# Patient Record
Sex: FEMALE | Race: WHITE | NOT HISPANIC OR LATINO | URBAN - METROPOLITAN AREA
[De-identification: names, ages, dates, MRNs, and addresses within clinical notes are randomized per-mention and may not be internally consistent; named-entity substitution may affect disease eponyms.]

---

## 2017-01-09 ENCOUNTER — COMMUNICATION - HEALTHEAST (OUTPATIENT)
Dept: FAMILY MEDICINE | Facility: CLINIC | Age: 22
End: 2017-01-09

## 2017-01-09 DIAGNOSIS — F90.1 ADHD (ATTENTION DEFICIT HYPERACTIVITY DISORDER), PREDOMINANTLY HYPERACTIVE IMPULSIVE TYPE: ICD-10-CM

## 2017-02-06 ENCOUNTER — COMMUNICATION - HEALTHEAST (OUTPATIENT)
Dept: FAMILY MEDICINE | Facility: CLINIC | Age: 22
End: 2017-02-06

## 2017-02-06 DIAGNOSIS — F90.1 ADHD (ATTENTION DEFICIT HYPERACTIVITY DISORDER), PREDOMINANTLY HYPERACTIVE IMPULSIVE TYPE: ICD-10-CM

## 2017-03-06 ENCOUNTER — COMMUNICATION - HEALTHEAST (OUTPATIENT)
Dept: FAMILY MEDICINE | Facility: CLINIC | Age: 22
End: 2017-03-06

## 2017-03-06 DIAGNOSIS — F90.1 ADHD (ATTENTION DEFICIT HYPERACTIVITY DISORDER), PREDOMINANTLY HYPERACTIVE IMPULSIVE TYPE: ICD-10-CM

## 2017-04-04 ENCOUNTER — COMMUNICATION - HEALTHEAST (OUTPATIENT)
Dept: FAMILY MEDICINE | Facility: CLINIC | Age: 22
End: 2017-04-04

## 2017-04-04 DIAGNOSIS — F90.1 ADHD (ATTENTION DEFICIT HYPERACTIVITY DISORDER), PREDOMINANTLY HYPERACTIVE IMPULSIVE TYPE: ICD-10-CM

## 2017-05-02 ENCOUNTER — COMMUNICATION - HEALTHEAST (OUTPATIENT)
Dept: FAMILY MEDICINE | Facility: CLINIC | Age: 22
End: 2017-05-02

## 2017-05-02 DIAGNOSIS — F90.1 ADHD (ATTENTION DEFICIT HYPERACTIVITY DISORDER), PREDOMINANTLY HYPERACTIVE IMPULSIVE TYPE: ICD-10-CM

## 2017-05-31 ENCOUNTER — OFFICE VISIT - HEALTHEAST (OUTPATIENT)
Dept: FAMILY MEDICINE | Facility: CLINIC | Age: 22
End: 2017-05-31

## 2017-05-31 DIAGNOSIS — F90.1 ADHD (ATTENTION DEFICIT HYPERACTIVITY DISORDER), PREDOMINANTLY HYPERACTIVE IMPULSIVE TYPE: ICD-10-CM

## 2017-05-31 DIAGNOSIS — Z23 IMMUNIZATION DUE: ICD-10-CM

## 2017-06-02 ENCOUNTER — COMMUNICATION - HEALTHEAST (OUTPATIENT)
Dept: FAMILY MEDICINE | Facility: CLINIC | Age: 22
End: 2017-06-02

## 2017-06-02 DIAGNOSIS — F90.1 ADHD (ATTENTION DEFICIT HYPERACTIVITY DISORDER), PREDOMINANTLY HYPERACTIVE IMPULSIVE TYPE: ICD-10-CM

## 2017-06-30 ENCOUNTER — OFFICE VISIT - HEALTHEAST (OUTPATIENT)
Dept: FAMILY MEDICINE | Facility: CLINIC | Age: 22
End: 2017-06-30

## 2017-06-30 DIAGNOSIS — S61.219A LACERATION OF FINGER, LEFT: ICD-10-CM

## 2017-07-03 ENCOUNTER — COMMUNICATION - HEALTHEAST (OUTPATIENT)
Dept: FAMILY MEDICINE | Facility: CLINIC | Age: 22
End: 2017-07-03

## 2017-07-03 DIAGNOSIS — F90.1 ADHD (ATTENTION DEFICIT HYPERACTIVITY DISORDER), PREDOMINANTLY HYPERACTIVE IMPULSIVE TYPE: ICD-10-CM

## 2017-07-31 ENCOUNTER — COMMUNICATION - HEALTHEAST (OUTPATIENT)
Dept: FAMILY MEDICINE | Facility: CLINIC | Age: 22
End: 2017-07-31

## 2017-07-31 DIAGNOSIS — F90.1 ADHD (ATTENTION DEFICIT HYPERACTIVITY DISORDER), PREDOMINANTLY HYPERACTIVE IMPULSIVE TYPE: ICD-10-CM

## 2017-08-23 ENCOUNTER — COMMUNICATION - HEALTHEAST (OUTPATIENT)
Dept: FAMILY MEDICINE | Facility: CLINIC | Age: 22
End: 2017-08-23

## 2017-08-23 DIAGNOSIS — Z30.9 CONTRACEPTIVE MANAGEMENT: ICD-10-CM

## 2017-08-23 DIAGNOSIS — F90.1 ADHD (ATTENTION DEFICIT HYPERACTIVITY DISORDER), PREDOMINANTLY HYPERACTIVE IMPULSIVE TYPE: ICD-10-CM

## 2017-09-29 ENCOUNTER — COMMUNICATION - HEALTHEAST (OUTPATIENT)
Dept: FAMILY MEDICINE | Facility: CLINIC | Age: 22
End: 2017-09-29

## 2017-09-29 DIAGNOSIS — F90.1 ADHD (ATTENTION DEFICIT HYPERACTIVITY DISORDER), PREDOMINANTLY HYPERACTIVE IMPULSIVE TYPE: ICD-10-CM

## 2017-10-27 ENCOUNTER — COMMUNICATION - HEALTHEAST (OUTPATIENT)
Dept: FAMILY MEDICINE | Facility: CLINIC | Age: 22
End: 2017-10-27

## 2017-10-27 DIAGNOSIS — F90.1 ADHD (ATTENTION DEFICIT HYPERACTIVITY DISORDER), PREDOMINANTLY HYPERACTIVE IMPULSIVE TYPE: ICD-10-CM

## 2017-11-27 ENCOUNTER — COMMUNICATION - HEALTHEAST (OUTPATIENT)
Dept: FAMILY MEDICINE | Facility: CLINIC | Age: 22
End: 2017-11-27

## 2017-11-27 DIAGNOSIS — F90.1 ADHD (ATTENTION DEFICIT HYPERACTIVITY DISORDER), PREDOMINANTLY HYPERACTIVE IMPULSIVE TYPE: ICD-10-CM

## 2017-12-22 ENCOUNTER — COMMUNICATION - HEALTHEAST (OUTPATIENT)
Dept: FAMILY MEDICINE | Facility: CLINIC | Age: 22
End: 2017-12-22

## 2017-12-22 DIAGNOSIS — Z30.9 CONTRACEPTIVE MANAGEMENT: ICD-10-CM

## 2017-12-27 ENCOUNTER — COMMUNICATION - HEALTHEAST (OUTPATIENT)
Dept: FAMILY MEDICINE | Facility: CLINIC | Age: 22
End: 2017-12-27

## 2017-12-27 DIAGNOSIS — F90.1 ADHD (ATTENTION DEFICIT HYPERACTIVITY DISORDER), PREDOMINANTLY HYPERACTIVE IMPULSIVE TYPE: ICD-10-CM

## 2018-02-01 ENCOUNTER — OFFICE VISIT - HEALTHEAST (OUTPATIENT)
Dept: FAMILY MEDICINE | Facility: CLINIC | Age: 23
End: 2018-02-01

## 2018-02-01 DIAGNOSIS — F90.1 ADHD (ATTENTION DEFICIT HYPERACTIVITY DISORDER), PREDOMINANTLY HYPERACTIVE IMPULSIVE TYPE: ICD-10-CM

## 2018-02-01 DIAGNOSIS — Z30.9 CONTRACEPTIVE MANAGEMENT: ICD-10-CM

## 2018-02-01 ASSESSMENT — MIFFLIN-ST. JEOR: SCORE: 1462.09

## 2018-02-28 ENCOUNTER — COMMUNICATION - HEALTHEAST (OUTPATIENT)
Dept: FAMILY MEDICINE | Facility: CLINIC | Age: 23
End: 2018-02-28

## 2018-02-28 DIAGNOSIS — F90.1 ADHD (ATTENTION DEFICIT HYPERACTIVITY DISORDER), PREDOMINANTLY HYPERACTIVE IMPULSIVE TYPE: ICD-10-CM

## 2018-03-29 ENCOUNTER — COMMUNICATION - HEALTHEAST (OUTPATIENT)
Dept: FAMILY MEDICINE | Facility: CLINIC | Age: 23
End: 2018-03-29

## 2018-03-29 DIAGNOSIS — F90.1 ADHD (ATTENTION DEFICIT HYPERACTIVITY DISORDER), PREDOMINANTLY HYPERACTIVE IMPULSIVE TYPE: ICD-10-CM

## 2018-04-27 ENCOUNTER — COMMUNICATION - HEALTHEAST (OUTPATIENT)
Dept: FAMILY MEDICINE | Facility: CLINIC | Age: 23
End: 2018-04-27

## 2018-04-27 DIAGNOSIS — F90.1 ADHD (ATTENTION DEFICIT HYPERACTIVITY DISORDER), PREDOMINANTLY HYPERACTIVE IMPULSIVE TYPE: ICD-10-CM

## 2018-05-24 ENCOUNTER — COMMUNICATION - HEALTHEAST (OUTPATIENT)
Dept: FAMILY MEDICINE | Facility: CLINIC | Age: 23
End: 2018-05-24

## 2018-05-24 DIAGNOSIS — Z30.9 CONTRACEPTIVE MANAGEMENT: ICD-10-CM

## 2018-05-25 ENCOUNTER — COMMUNICATION - HEALTHEAST (OUTPATIENT)
Dept: FAMILY MEDICINE | Facility: CLINIC | Age: 23
End: 2018-05-25

## 2018-05-25 DIAGNOSIS — F90.1 ADHD (ATTENTION DEFICIT HYPERACTIVITY DISORDER), PREDOMINANTLY HYPERACTIVE IMPULSIVE TYPE: ICD-10-CM

## 2018-06-25 ENCOUNTER — COMMUNICATION - HEALTHEAST (OUTPATIENT)
Dept: FAMILY MEDICINE | Facility: CLINIC | Age: 23
End: 2018-06-25

## 2018-06-25 DIAGNOSIS — F90.1 ADHD (ATTENTION DEFICIT HYPERACTIVITY DISORDER), PREDOMINANTLY HYPERACTIVE IMPULSIVE TYPE: ICD-10-CM

## 2018-07-25 ENCOUNTER — COMMUNICATION - HEALTHEAST (OUTPATIENT)
Dept: FAMILY MEDICINE | Facility: CLINIC | Age: 23
End: 2018-07-25

## 2018-07-25 DIAGNOSIS — F90.1 ADHD (ATTENTION DEFICIT HYPERACTIVITY DISORDER), PREDOMINANTLY HYPERACTIVE IMPULSIVE TYPE: ICD-10-CM

## 2018-08-27 ENCOUNTER — COMMUNICATION - HEALTHEAST (OUTPATIENT)
Dept: FAMILY MEDICINE | Facility: CLINIC | Age: 23
End: 2018-08-27

## 2018-08-27 DIAGNOSIS — F90.1 ADHD (ATTENTION DEFICIT HYPERACTIVITY DISORDER), PREDOMINANTLY HYPERACTIVE IMPULSIVE TYPE: ICD-10-CM

## 2018-10-01 ENCOUNTER — COMMUNICATION - HEALTHEAST (OUTPATIENT)
Dept: FAMILY MEDICINE | Facility: CLINIC | Age: 23
End: 2018-10-01

## 2018-10-01 DIAGNOSIS — F90.1 ADHD (ATTENTION DEFICIT HYPERACTIVITY DISORDER), PREDOMINANTLY HYPERACTIVE IMPULSIVE TYPE: ICD-10-CM

## 2018-11-05 ENCOUNTER — COMMUNICATION - HEALTHEAST (OUTPATIENT)
Dept: FAMILY MEDICINE | Facility: CLINIC | Age: 23
End: 2018-11-05

## 2018-11-05 DIAGNOSIS — F90.1 ADHD (ATTENTION DEFICIT HYPERACTIVITY DISORDER), PREDOMINANTLY HYPERACTIVE IMPULSIVE TYPE: ICD-10-CM

## 2019-04-04 ENCOUNTER — COMMUNICATION - HEALTHEAST (OUTPATIENT)
Dept: FAMILY MEDICINE | Facility: CLINIC | Age: 24
End: 2019-04-04

## 2019-05-22 ENCOUNTER — COMMUNICATION - HEALTHEAST (OUTPATIENT)
Dept: FAMILY MEDICINE | Facility: CLINIC | Age: 24
End: 2019-05-22

## 2019-05-23 ENCOUNTER — OFFICE VISIT - HEALTHEAST (OUTPATIENT)
Dept: FAMILY MEDICINE | Facility: CLINIC | Age: 24
End: 2019-05-23

## 2019-05-23 DIAGNOSIS — F90.2 ADHD (ATTENTION DEFICIT HYPERACTIVITY DISORDER), COMBINED TYPE: ICD-10-CM

## 2019-05-23 ASSESSMENT — MIFFLIN-ST. JEOR: SCORE: 1383.84

## 2019-05-24 ENCOUNTER — COMMUNICATION - HEALTHEAST (OUTPATIENT)
Dept: FAMILY MEDICINE | Facility: CLINIC | Age: 24
End: 2019-05-24

## 2019-05-24 DIAGNOSIS — F90.1 ADHD (ATTENTION DEFICIT HYPERACTIVITY DISORDER), PREDOMINANTLY HYPERACTIVE IMPULSIVE TYPE: ICD-10-CM

## 2019-06-05 ENCOUNTER — COMMUNICATION - HEALTHEAST (OUTPATIENT)
Dept: FAMILY MEDICINE | Facility: CLINIC | Age: 24
End: 2019-06-05

## 2020-11-23 ENCOUNTER — OFFICE VISIT - HEALTHEAST (OUTPATIENT)
Dept: INTERNAL MEDICINE | Facility: CLINIC | Age: 25
End: 2020-11-23

## 2020-11-23 DIAGNOSIS — F33.3 SEVERE EPISODE OF RECURRENT MAJOR DEPRESSIVE DISORDER, WITH PSYCHOTIC FEATURES (H): ICD-10-CM

## 2020-11-23 LAB
ALBUMIN SERPL-MCNC: 4.7 G/DL (ref 3.5–5)
ALP SERPL-CCNC: 61 U/L (ref 45–120)
ALT SERPL W P-5'-P-CCNC: 35 U/L (ref 0–45)
ANION GAP SERPL CALCULATED.3IONS-SCNC: 10 MMOL/L (ref 5–18)
AST SERPL W P-5'-P-CCNC: 28 U/L (ref 0–40)
BASOPHILS # BLD AUTO: 0 THOU/UL (ref 0–0.2)
BASOPHILS NFR BLD AUTO: 1 % (ref 0–2)
BILIRUB DIRECT SERPL-MCNC: 0.1 MG/DL
BILIRUB SERPL-MCNC: 0.3 MG/DL (ref 0–1)
BUN SERPL-MCNC: 9 MG/DL (ref 8–22)
CALCIUM SERPL-MCNC: 9.5 MG/DL (ref 8.5–10.5)
CHLORIDE BLD-SCNC: 107 MMOL/L (ref 98–107)
CO2 SERPL-SCNC: 25 MMOL/L (ref 22–31)
CREAT SERPL-MCNC: 0.74 MG/DL (ref 0.6–1.1)
EOSINOPHIL # BLD AUTO: 0.1 THOU/UL (ref 0–0.4)
EOSINOPHIL NFR BLD AUTO: 2 % (ref 0–6)
ERYTHROCYTE [DISTWIDTH] IN BLOOD BY AUTOMATED COUNT: 11.5 % (ref 11–14.5)
GFR SERPL CREATININE-BSD FRML MDRD: >60 ML/MIN/1.73M2
GLUCOSE BLD-MCNC: 98 MG/DL (ref 70–125)
HCT VFR BLD AUTO: 41.3 % (ref 35–47)
HGB BLD-MCNC: 13.5 G/DL (ref 12–16)
LYMPHOCYTES # BLD AUTO: 2.3 THOU/UL (ref 0.8–4.4)
LYMPHOCYTES NFR BLD AUTO: 27 % (ref 20–40)
MCH RBC QN AUTO: 31 PG (ref 27–34)
MCHC RBC AUTO-ENTMCNC: 32.6 G/DL (ref 32–36)
MCV RBC AUTO: 95 FL (ref 80–100)
MONOCYTES # BLD AUTO: 0.3 THOU/UL (ref 0–0.9)
MONOCYTES NFR BLD AUTO: 4 % (ref 2–10)
NEUTROPHILS # BLD AUTO: 5.8 THOU/UL (ref 2–7.7)
NEUTROPHILS NFR BLD AUTO: 67 % (ref 50–70)
PLATELET # BLD AUTO: 244 THOU/UL (ref 140–440)
PMV BLD AUTO: 8.8 FL (ref 7–10)
POTASSIUM BLD-SCNC: 4.5 MMOL/L (ref 3.5–5)
PROT SERPL-MCNC: 7.2 G/DL (ref 6–8)
RBC # BLD AUTO: 4.35 MILL/UL (ref 3.8–5.4)
SODIUM SERPL-SCNC: 142 MMOL/L (ref 136–145)
TSH SERPL DL<=0.005 MIU/L-ACNC: 3.35 UIU/ML (ref 0.3–5)
WBC: 8.6 THOU/UL (ref 4–11)

## 2020-11-23 ASSESSMENT — ANXIETY QUESTIONNAIRES
IF YOU CHECKED OFF ANY PROBLEMS ON THIS QUESTIONNAIRE, HOW DIFFICULT HAVE THESE PROBLEMS MADE IT FOR YOU TO DO YOUR WORK, TAKE CARE OF THINGS AT HOME, OR GET ALONG WITH OTHER PEOPLE: EXTREMELY DIFFICULT
2. NOT BEING ABLE TO STOP OR CONTROL WORRYING: NEARLY EVERY DAY
5. BEING SO RESTLESS THAT IT IS HARD TO SIT STILL: NEARLY EVERY DAY
3. WORRYING TOO MUCH ABOUT DIFFERENT THINGS: NEARLY EVERY DAY
7. FEELING AFRAID AS IF SOMETHING AWFUL MIGHT HAPPEN: NEARLY EVERY DAY
1. FEELING NERVOUS, ANXIOUS, OR ON EDGE: NEARLY EVERY DAY
4. TROUBLE RELAXING: NEARLY EVERY DAY
6. BECOMING EASILY ANNOYED OR IRRITABLE: NEARLY EVERY DAY
GAD7 TOTAL SCORE: 21

## 2020-11-23 ASSESSMENT — PATIENT HEALTH QUESTIONNAIRE - PHQ9: SUM OF ALL RESPONSES TO PHQ QUESTIONS 1-9: 27

## 2020-11-23 ASSESSMENT — MIFFLIN-ST. JEOR: SCORE: 1420.13

## 2020-11-24 ENCOUNTER — COMMUNICATION - HEALTHEAST (OUTPATIENT)
Dept: INTERNAL MEDICINE | Facility: CLINIC | Age: 25
End: 2020-11-24

## 2020-11-24 DIAGNOSIS — E55.9 VITAMIN D DEFICIENCY: ICD-10-CM

## 2020-11-24 LAB — 25(OH)D3 SERPL-MCNC: 7.6 NG/ML (ref 30–80)

## 2020-11-25 ENCOUNTER — COMMUNICATION - HEALTHEAST (OUTPATIENT)
Dept: BEHAVIORAL HEALTH | Facility: CLINIC | Age: 25
End: 2020-11-25

## 2020-11-30 ENCOUNTER — OFFICE VISIT - HEALTHEAST (OUTPATIENT)
Dept: INTERNAL MEDICINE | Facility: CLINIC | Age: 25
End: 2020-11-30

## 2020-11-30 DIAGNOSIS — F33.3 SEVERE EPISODE OF RECURRENT MAJOR DEPRESSIVE DISORDER, WITH PSYCHOTIC FEATURES (H): ICD-10-CM

## 2020-11-30 DIAGNOSIS — E55.9 VITAMIN D DEFICIENCY: ICD-10-CM

## 2020-11-30 ASSESSMENT — PATIENT HEALTH QUESTIONNAIRE - PHQ9: SUM OF ALL RESPONSES TO PHQ QUESTIONS 1-9: 26

## 2020-12-07 ENCOUNTER — OFFICE VISIT - HEALTHEAST (OUTPATIENT)
Dept: INTERNAL MEDICINE | Facility: CLINIC | Age: 25
End: 2020-12-07

## 2020-12-07 DIAGNOSIS — F33.3 SEVERE EPISODE OF RECURRENT MAJOR DEPRESSIVE DISORDER, WITH PSYCHOTIC FEATURES (H): ICD-10-CM

## 2020-12-07 RX ORDER — ESCITALOPRAM OXALATE 10 MG/1
10 TABLET ORAL DAILY
Qty: 90 TABLET | Refills: 1 | Status: SHIPPED | OUTPATIENT
Start: 2020-12-07 | End: 2023-02-02

## 2020-12-07 ASSESSMENT — PATIENT HEALTH QUESTIONNAIRE - PHQ9: SUM OF ALL RESPONSES TO PHQ QUESTIONS 1-9: 15

## 2020-12-08 ENCOUNTER — COMMUNICATION - HEALTHEAST (OUTPATIENT)
Dept: INTERNAL MEDICINE | Facility: CLINIC | Age: 25
End: 2020-12-08

## 2021-01-21 ENCOUNTER — COMMUNICATION - HEALTHEAST (OUTPATIENT)
Dept: INTERNAL MEDICINE | Facility: CLINIC | Age: 26
End: 2021-01-21

## 2021-02-09 ENCOUNTER — OFFICE VISIT - HEALTHEAST (OUTPATIENT)
Dept: INTERNAL MEDICINE | Facility: CLINIC | Age: 26
End: 2021-02-09

## 2021-02-09 DIAGNOSIS — F41.9 ANXIETY: ICD-10-CM

## 2021-02-09 DIAGNOSIS — E55.9 VITAMIN D DEFICIENCY: ICD-10-CM

## 2021-02-09 DIAGNOSIS — F33.3 SEVERE EPISODE OF RECURRENT MAJOR DEPRESSIVE DISORDER, WITH PSYCHOTIC FEATURES (H): ICD-10-CM

## 2021-02-09 RX ORDER — HYDROXYZINE PAMOATE 25 MG/1
CAPSULE ORAL
Status: SHIPPED | COMMUNITY
Start: 2020-12-02

## 2021-02-09 ASSESSMENT — ANXIETY QUESTIONNAIRES
7. FEELING AFRAID AS IF SOMETHING AWFUL MIGHT HAPPEN: NEARLY EVERY DAY
IF YOU CHECKED OFF ANY PROBLEMS ON THIS QUESTIONNAIRE, HOW DIFFICULT HAVE THESE PROBLEMS MADE IT FOR YOU TO DO YOUR WORK, TAKE CARE OF THINGS AT HOME, OR GET ALONG WITH OTHER PEOPLE: EXTREMELY DIFFICULT
7. FEELING AFRAID AS IF SOMETHING AWFUL MIGHT HAPPEN: NEARLY EVERY DAY
IF YOU CHECKED OFF ANY PROBLEMS ON THIS QUESTIONNAIRE, HOW DIFFICULT HAVE THESE PROBLEMS MADE IT FOR YOU TO DO YOUR WORK, TAKE CARE OF THINGS AT HOME, OR GET ALONG WITH OTHER PEOPLE: VERY DIFFICULT
GAD7 TOTAL SCORE: 18
2. NOT BEING ABLE TO STOP OR CONTROL WORRYING: NEARLY EVERY DAY
1. FEELING NERVOUS, ANXIOUS, OR ON EDGE: NEARLY EVERY DAY
3. WORRYING TOO MUCH ABOUT DIFFERENT THINGS: NEARLY EVERY DAY
4. TROUBLE RELAXING: SEVERAL DAYS
GAD7 TOTAL SCORE: 18
2. NOT BEING ABLE TO STOP OR CONTROL WORRYING: NEARLY EVERY DAY
4. TROUBLE RELAXING: SEVERAL DAYS
1. FEELING NERVOUS, ANXIOUS, OR ON EDGE: NEARLY EVERY DAY
5. BEING SO RESTLESS THAT IT IS HARD TO SIT STILL: MORE THAN HALF THE DAYS
3. WORRYING TOO MUCH ABOUT DIFFERENT THINGS: NEARLY EVERY DAY
6. BECOMING EASILY ANNOYED OR IRRITABLE: NEARLY EVERY DAY
6. BECOMING EASILY ANNOYED OR IRRITABLE: NEARLY EVERY DAY
5. BEING SO RESTLESS THAT IT IS HARD TO SIT STILL: MORE THAN HALF THE DAYS

## 2021-02-09 ASSESSMENT — PATIENT HEALTH QUESTIONNAIRE - PHQ9: SUM OF ALL RESPONSES TO PHQ QUESTIONS 1-9: 6

## 2021-05-01 ENCOUNTER — COMMUNICATION - HEALTHEAST (OUTPATIENT)
Dept: INTERNAL MEDICINE | Facility: CLINIC | Age: 26
End: 2021-05-01

## 2021-05-01 DIAGNOSIS — E55.9 VITAMIN D DEFICIENCY: ICD-10-CM

## 2021-05-27 ASSESSMENT — PATIENT HEALTH QUESTIONNAIRE - PHQ9
SUM OF ALL RESPONSES TO PHQ QUESTIONS 1-9: 27
SUM OF ALL RESPONSES TO PHQ QUESTIONS 1-9: 6
SUM OF ALL RESPONSES TO PHQ QUESTIONS 1-9: 15
SUM OF ALL RESPONSES TO PHQ QUESTIONS 1-9: 26

## 2021-05-28 ASSESSMENT — ANXIETY QUESTIONNAIRES
GAD7 TOTAL SCORE: 21
GAD7 TOTAL SCORE: 18

## 2021-05-28 NOTE — TELEPHONE ENCOUNTER
Left message to call back for: Suzy-2nd attempt  Information to relay to patient:      LM for Suzy to find out if she has reestablished care with another provider.  Looks like her last visit was with Rodney 2/2018.  Please help make establish care Physical with another provider if needed.

## 2021-05-29 NOTE — TELEPHONE ENCOUNTER
Doctor hoped patient would be able to start on 5/28/19.        Prior Authorization Request  Who s requesting:  Patient  Pharmacy Name and Location: [facility] #[id number]  Medication Name: Modafinil 200 mg, one daily  Insurance Plan: [employer]  Insurance Member ID Number:  [id number]  Informed patient that prior authorizations can take up to 10 business days for response:   Yes  Okay to leave a detailed message: Yes

## 2021-05-29 NOTE — TELEPHONE ENCOUNTER
Central PA team  [phone]  Pool: HE PA MED ([id number])          PA has been initiated.       PA form completed and faxed insurance via Cover My Meds     Key:  [id number]     Medication:  modafinil (PROVIGIL) 200 MG tablet    Insurance:  [employer]         Response will be received via fax and may take up to 5-10 business days depending on plan

## 2021-05-29 NOTE — TELEPHONE ENCOUNTER
Fax received from [facility], they have started the Prior Authorization Process via Cover My Meds    CoverMyMeds Key: [id number]    Medication Name:   atomoxetine (STRATTERA) 40 MG capsule 42 capsule 0 6/5/2019 6/19/2019    Sig - Route: Take 1 capsule (40 mg total) by mouth daily for 14 days. Then increase and take 1 capsule twice daily. - Oral    Sent to pharmacy as: atomoxetine (STRATTERA) 40 MG capsule    E-Prescribing Status: Receipt confirmed by pharmacy (6/5/2019  8:32 AM CDT)          Insurance Plan:   PBM: [employer]  Patient ID: not provided on fax    Please complete the PA process

## 2021-05-29 NOTE — PROGRESS NOTES
Assessment:     1. ADHD (attention deficit hyperactivity disorder), combined type  modafinil (PROVIGIL) 200 MG tablet       Plan:     1. ADHD (attention deficit hyperactivity disorder), combined type  I have reviewed patient's past medical history; my impression is that Adderall is not indicated at this time; we will start the following medication daily when she starts her new job next week pros and cons of therapy discussed at length with the patient  - modafinil (PROVIGIL) 200 MG tablet; Take 1 tablet (200 mg total) by mouth daily.  Dispense: 60 tablet; Refill: 2      Subjective:   Suzy is a new patient to our practice here.  Previously she was seen at another [facility] clinic but her provider has moved down.  She has a history of a mixed type of ADHD with difficulty concentrating and in the past she was on Adderall 30 mg extended release daily.  She found that she was taking more than the prescribed amount of medication and was getting a little bit euphoric on it and therefore stopped it on her own.  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  She is a social drinker and just drinks beer for 5/week.  She does admit to using inhaled pot for relaxation 3 or 4 times per week.  We pointed out to her that we would be willing to help her with her ADHD if she were willing to give up the THC habit and she said absolutely this would not be a problem and I tend to believe the patient.  We discussed using modafinil in place of it and I will prescribe that and we got her to sign a CSA agreement with his usual regulations.  We will see her for follow-up 2 months after starting 200 mg dosage and may or may not do a spot urine screen at that time for THC.  She admits to never using any other type of prescribed or street drug including benzodiazepines opioids or other stimulants.  Patient is in a stable relationship for many years and plans on starting a  "family sometime in the future along with new responsibilities.  I will see the patient for follow-up in 2 months.  Very pleasant young woman.    Review of Systems: A complete 14 point review of systems was obtained and is negative or as stated in the history of present illness.    Past Medical History:   Diagnosis Date     ADHD (attention deficit hyperactivity disorder)      Constipation      No family history on file.  Past Surgical History:   Procedure Laterality Date     AR DENTAL SURGERY PROCEDURE      Description: Oral Surgery Tooth Extraction;  Recorded: 01/12/2014;  Comments: [place] teeth     Social History     Tobacco Use     Smoking status: Never Smoker     Smokeless tobacco: Never Used   Substance Use Topics     Alcohol use: No     Drug use: No         Objective:   /88   Pulse 94   Ht 5' 7.5\" (1.715 m)   Wt 133 lb (60.3 kg)   SpO2 99%   BMI 20.52 kg/m      General Appearance:  Alert, cooperative, no distress  Head:  Normocephalic, no obvious abnormality  Ears: TM anatomy normal  Eyes:  PERRL, EOM's intact, conjunctiva and corneas clear  Nose:  Nares symmetrical, septum midline, mucosa pink, no sinus tenderness  Throat:  Lips, tongue, and mucosa are moist, pink, and intact  Neck:  Supple, symmetrical, trachea midline, no adenopathy; thyroid: no enlargement, symmetric,no tenderness/mass/nodules; no carotid bruit, no JVD  Back:  Symmetrical, no curvature, ROM normal, no CVA tenderness  Chest/Breast:  No mass or tenderness  Lungs:  Clear to auscultation bilaterally, respirations unlabored   Heart:  Normal PMI, regular rate & rhythm, S1 and S2 normal, no murmurs, rubs, or gallops  Abdomen:  Soft, non-tender, bowel sounds active all four quadrants, no mass, or organomegaly  Musculoskeletal:  Tone and strength strong and symmetrical, all extremities  Lymphatic:  No adenopathy  Skin/Hair/Nails:  Skin warm, dry, and intact, no rashes  Neurologic:  Alert and oriented x3, no cranial nerve deficits, normal " strength and tone, gait steady  Extremities:  No edema.  Jorge's sign negative.    Genitourinary: deferred  Pulses:  Equal bilaterally           This note has been dictated using voice recognition software. Any grammatical or context distortions are unintentional and inherent to the the software.

## 2021-05-29 NOTE — TELEPHONE ENCOUNTER
Left message to call back for: medication problem  Information to relay to patient:  Below message

## 2021-05-29 NOTE — TELEPHONE ENCOUNTER
Patient Returning Call  Reason for call:  Mother calling back   Information relayed to patient:  PA for Strattera is approved and pharmacy is aware.  Mother will let patient know to call her pharmacy to see if it ready for  .  No further questions.    Patient has additional questions:  No  If YES, what are your questions/concerns:  NA  Okay to leave a detailed message?: No call back needed

## 2021-05-29 NOTE — TELEPHONE ENCOUNTER
Please advise  PA for modafinil was denied because ADHD is not a covered diagnosis      Per 5/23/2019 OV note:   1. ADHD (attention deficit hyperactivity disorder), combined type  I have reviewed patient's past medical history; my impression is that Adderall is not indicated at this time; we will start the following medication daily when she starts her new job next week pros and cons of therapy discussed at length with the patient  - modafinil (PROVIGIL) 200 MG tablet; Take 1 tablet (200 mg total) by mouth daily.  Dispense: 60 tablet; Refill: 2

## 2021-05-29 NOTE — TELEPHONE ENCOUNTER
New Appointment Needed  What is the reason for the visit:    discuss ADHD meds. pt was denied the appointment for online scheduling.  Provider Preference: PCP only  How soon do you need to be seen?: when available.  Waitlist offered?: No  Okay to leave a detailed message:  Yes

## 2021-05-29 NOTE — TELEPHONE ENCOUNTER
Left message to call back for:Suzy  Information to relay to patient:  Needs to scheduled establish care with new provider as Reema Stevens CNP is no longer at [facility].  India Yap [facility] CMT

## 2021-05-29 NOTE — TELEPHONE ENCOUNTER
Strattera 40 mg daily for two weeks;then one twice daily thereafter;  #42 for first month then #60 theafter.  Diagnosis is ADHD mixed type; this is a NON addictive medicine

## 2021-05-29 NOTE — TELEPHONE ENCOUNTER
Central PA team  [phone]  Pool: HE PA MED ([id number])          PA has been initiated.       PA form completed and faxed insurance via Cover My Meds     Key:  [id number] - PA Case ID: [id number] - Rx #: [id number]     Medication:  Atomoxetine HCl 40MG capsules    Insurance:  [employer]        Response will be received via fax and may take up to 5-10 business days depending on plan

## 2021-05-29 NOTE — TELEPHONE ENCOUNTER
Patient Returning Call  Reason for call:  Return call  Information relayed to patient:  Patient was informed her mother called asking for an alternative and Wale Arevalo MD wanted to send over Strattera 40 mg - take 1 tab per day for 2 weeks, and then increase to 1 tab twice a day after 2 weeks.  Patient has additional questions:  Yes  If YES, what are your questions/concerns:  Patient verbalized agreement. Please send Stratterra 40 mg to [facility] in [place].  Okay to leave a detailed message?: No call back needed

## 2021-05-29 NOTE — TELEPHONE ENCOUNTER
PA was denied  Will need to send a covered alternative to the pharmacy otherwise a letter of medical necessity needs to be drafted for an appeal

## 2021-05-30 ENCOUNTER — RECORDS - HEALTHEAST (OUTPATIENT)
Dept: ADMINISTRATIVE | Facility: CLINIC | Age: 26
End: 2021-05-30

## 2021-05-30 VITALS — BODY MASS INDEX: 19.57 KG/M2 | WEIGHT: 128.7 LBS

## 2021-05-31 VITALS — WEIGHT: 129 LBS | BODY MASS INDEX: 19.61 KG/M2

## 2021-05-31 VITALS — WEIGHT: 148.5 LBS | HEIGHT: 68 IN | BODY MASS INDEX: 22.51 KG/M2

## 2021-06-02 VITALS — WEIGHT: 133 LBS | BODY MASS INDEX: 20.16 KG/M2 | HEIGHT: 68 IN

## 2021-06-04 ENCOUNTER — RECORDS - HEALTHEAST (OUTPATIENT)
Dept: ADMINISTRATIVE | Facility: CLINIC | Age: 26
End: 2021-06-04

## 2021-06-05 VITALS
DIASTOLIC BLOOD PRESSURE: 60 MMHG | WEIGHT: 142 LBS | BODY MASS INDEX: 21.91 KG/M2 | SYSTOLIC BLOOD PRESSURE: 118 MMHG | HEART RATE: 73 BPM | OXYGEN SATURATION: 98 %

## 2021-06-05 VITALS
HEIGHT: 68 IN | OXYGEN SATURATION: 98 % | WEIGHT: 141 LBS | DIASTOLIC BLOOD PRESSURE: 86 MMHG | HEART RATE: 77 BPM | BODY MASS INDEX: 21.37 KG/M2 | SYSTOLIC BLOOD PRESSURE: 127 MMHG

## 2021-06-05 VITALS
HEART RATE: 80 BPM | DIASTOLIC BLOOD PRESSURE: 86 MMHG | OXYGEN SATURATION: 99 % | SYSTOLIC BLOOD PRESSURE: 127 MMHG | BODY MASS INDEX: 22.84 KG/M2 | WEIGHT: 148 LBS

## 2021-06-05 VITALS
SYSTOLIC BLOOD PRESSURE: 120 MMHG | OXYGEN SATURATION: 99 % | WEIGHT: 143 LBS | DIASTOLIC BLOOD PRESSURE: 60 MMHG | HEART RATE: 67 BPM | BODY MASS INDEX: 22.07 KG/M2

## 2021-06-11 NOTE — PROGRESS NOTES
Subjective:      Patient ID: Ayesha Sanchez is a 22 y.o. female.    Chief Complaint:    HPI  Ayesha Sanchez is a 22 y.o. female who presents today with her mother complaining of a laceration to her left forth finger.   She was cutting snow this morning and cut the finger with some rick.  She has had quite a bit of bleeding from it.      Past Medical History:   Diagnosis Date     ADHD (attention deficit hyperactivity disorder)      Constipation        Past Surgical History:   Procedure Laterality Date     SC DENTAL SURGERY PROCEDURE      Description: Oral Surgery Tooth Extraction;  Recorded: 01/12/2014;  Comments: [place] teeth       No family history on file.    Social History   Substance Use Topics     Smoking status: Never Smoker     Smokeless tobacco: Never Used     Alcohol use No       Review of Systems    Objective:     /70 (Patient Site: Right Arm, Patient Position: Sitting, Cuff Size: Adult Regular)  Pulse 82  Temp 98.5  F (36.9  C) (Oral)   Resp 18  Wt 129 lb (58.5 kg)  LMP 06/19/2017 (Approximate)  SpO2 100%  Breastfeeding? No  BMI 19.61 kg/m2    Physical Exam   Constitutional: She appears well-developed and well-nourished.   Skin:   Skin avulsion on the tip of her left 4th finger.  Avulsion is round and 8 mm in diameter.  There is active bleeding.       Procedures    Patient refused any anesthesia.  The wound was irrigated with sterile saline.  There was continued active bleeding from end vessels and Surgicel was applied.  Good hemostasis was achieved.  The wound was then dressed with telfa and gauze wrap.      Assessment / Plan:     1. Laceration of finger, left  Tdap vaccine,  8yo or older,  IM    ibuprofen tablet 400 mg (ADVIL,MOTRIN)       Patient was instructed to leave the bandage in place tonight.  She should gently remove the bandage and redress starting tomorrow.  We discussed that the wound will need to heal by secondary intention.  Follow up if signs of  infection.

## 2021-06-11 NOTE — PROGRESS NOTES
Ayesha Sanchez is a 22 y.o. female who is here for a health maintenance visit.    Ayesha has history of ADHD she was evaluated on 1/14/2016 at [facility]  And diagnosed with ADHD combined inattentive/hyperactive impulsive type.   Taking Adderall XR and she has been taking 30mg and still feels her symptoms are controlled well.  She is consistent, she is able to get her day done.   She cut out coffee and this helped her tremendously  Her typical symptoms are inability to focus, complete tasks and this has improved.    Denies any side effects chest pain, racing skipped heartbeat, decreased appetite, decreased weight loss.    Good support systems with parents and boyfriend, friends.    Does need refill on her birth control, stable dosing denies any side effects.     Reviewed and updated chart below  No Known Allergies  Current Outpatient Prescriptions   Medication Sig Dispense Refill     dextroamphetamine-amphetamine (ADDERALL XR) 30 MG 24 hr capsule Take 1 capsule (30 mg total) by mouth daily. 30 capsule 0     LACTOBACILLUS ACIDOPHILUS (PROBIOTIC ORAL) Take by mouth daily.       norgestimate-ethinyl estradiol (TRINESSA, 28,) 0.18/0.215/0.25 mg-35 mcg (28) Tab tablet TAKE 1 TABLET BY MOUTH DAILY 84 tablet 2     docusate sodium (COLACE) 50 MG capsule Take by mouth 2 (two) times a day.       polyethylene glycol (MIRALAX) 17 gram packet Take 17 g by mouth daily.       No current facility-administered medications for this visit.      Past Medical History:   Diagnosis Date     ADHD (attention deficit hyperactivity disorder)      Constipation      Past Surgical History:   Procedure Laterality Date     PA DENTAL SURGERY PROCEDURE      Description: Oral Surgery Tooth Extraction;  Recorded: 01/12/2014;  Comments: [place] teeth     Social History     Social History     Marital status: Single     Spouse name: N/A     Number of children: 0     Years of education: N/A     Occupational History     [employer]  business     Social History Main Topics     Smoking status: Never Smoker     Smokeless tobacco: Never Used     Alcohol use No     Drug use: No     Sexual activity: Yes     Partners: Male     Birth control/ protection: OCP     Other Topics Concern     None     Social History Narrative     No family history on file.      Review of Systems   Constitutional: Negative.   HENT: Negative.   Eyes: Negative.   Respiratory: Negative.   Cardiovascular: Negative.   Gastrointestinal: Negative.   Endocrine: Negative.   Genitourinary: Negative.   Musculoskeletal: Negative.   Skin: Negative.   Allergic/Immunologic: Negative.   Neurological: Negative.   Hematological: Negative.   Psychiatric/Behavioral: Negative.       Objective:     Physical Exam   /70 (Patient Site: Left Arm, Patient Position: Sitting, Cuff Size: Adult Regular)  Pulse 75  Wt 128 lb 11.2 oz (58.4 kg)  LMP 04/30/2017 (Approximate)  SpO2 99%  Breastfeeding? No  BMI 19.57 kg/m2    Constitutional: Alert and oriented x3, well nourished without any acute distress  HENT:   Right Ear: External ear normal.   Left Ear: External ear normal.   Nose: Nose normal.   Mouth/Throat: Oropharynx is clear and moist.   Eyes: Conjunctivae and EOM are normal. Pupils are equal, round, and reactive to light. Right eye exhibits no discharge. Left eye exhibits no discharge.   Neck: No thyromegaly present.   Lymphadenopathy: Without palpable lymphadenopathy  Cardiovascular: Normal S1 and S2. Regular rate, rhythm and no murmur, rubs or gallops. Palpable distal pulses bilaterally.  Pulmonary/Chest: Normal effort. Lungs clear to auscultation in all lobes. Without wheezing, rhonchi or crackles.    Abdominal: Soft, non tenderness. There is no rebound or guarding. Bowel sounds x4. Without organomegaly. No CVA tenderness.  Musculoskeletal: 5/5 strength  And full ROM in all extremities. No joint swelling or deformity  Neurological: Cranial nerves intact, without deficit. Without numbness,  tingling or paresthesia. Normal reflexes  Skin: Dry and intact; without rashes or lesions.   Psychiatric: Normal mood and affect.   : External female genitalia without lesions. Introitus is normal, vaginal walls pink and moist without lesions. There is no cervical motion tenderness and the adnexa are without masses. There is no abnormal discharge from the cervix.   Breast: No chest deformity, asymmetry. Normal contours. Without nodules, masses, tenderness, or axillary adenopathy. No nipple discharge or dimpling noted.     1. Health care maintenance  Will order future labs and follow up with results once received  Pap smear completed today  Declined c/g, HPV vaccines today.   We discussed healthy lifestyle, nutrition, cardiovascular risk reduction, self care, safety, self breast exams, sunscreen, and seatbelt screening.  Recommended annual physical exam or sooner any acute concerns.     - Gynecologic Cytology (PAP Smear)  - Lipid Cascade; Future  - Comprehensive Metabolic Panel; Future  - Vitamin D, Total (25-Hydroxy); Future    2. ADHD (attention deficit hyperactivity disorder), predominantly hyperactive impulsive type  Vitals signs stable.   Continue with 30mg of Adderall working well for patient.   Reinforced risks vs benefits of medication  Discussed in detail side effects of adderall.   CSA up to date  Ayesha appeared pleased and agreed with plan.     - dextroamphetamine-amphetamine (ADDERALL XR) 30 MG 24 hr capsule; Take 1 capsule (30 mg total) by mouth daily.  Dispense: 30 capsule; Refill: 0    3. Contraceptive management  Stable, will refill birth control today  Reinforced risks vs benefit.   Discussed with patient to follow up if worsening symptoms, questions or concerns  Discussed red flags that would warrant urgent evaluation. Patient verbalized understanding.  - norgestimate-ethinyl estradiol (TRINESSA, 28,) 0.18/0.215/0.25 mg-35 mcg (28) Tab tablet; TAKE 1 TABLET BY MOUTH DAILY  Dispense: 84 tablet;  Refill: 2

## 2021-06-11 NOTE — PROGRESS NOTES
Ayesha Sanchez is a 22 y.o. female who is here for a health maintenance visit.    Ayesha has history of ADHD she was evaluated on 1/14/2016 at [facility]  And diagnosed with ADHD combined inattentive/hyperactive impulsive type.   Taking Adderall XR and she has been taking 30mg and still feels her symptoms are controlled well.  She is consistent, she is able to get her day done.   She states she finished her first semester of school and finished well with all A's - She is going to school for Business Administration, 2 years at [employer].   She works at [employer] and [employer] as well   Her typical symptoms are inability to focus, complete tasks and this has improved.    Denies any side effects chest pain, racing skipped heartbeat, decreased appetite, decreased weight loss. Denies any side effects from medication .   She states she did take medication this morning.    Good support systems with parents and boyfriend, friends.    Reviewed and updated chart below  No Known Allergies  Current Outpatient Prescriptions   Medication Sig Dispense Refill     dextroamphetamine-amphetamine (ADDERALL XR) 30 MG 24 hr capsule Take 1 capsule (30 mg total) by mouth daily. 30 capsule 0     LACTOBACILLUS ACIDOPHILUS (PROBIOTIC ORAL) Take by mouth daily.       norgestimate-ethinyl estradiol (TRINESSA, 28,) 0.18/0.215/0.25 mg-35 mcg (28) Tab tablet TAKE 1 TABLET BY MOUTH DAILY 84 tablet 2     docusate sodium (COLACE) 50 MG capsule Take by mouth 2 (two) times a day.       polyethylene glycol (MIRALAX) 17 gram packet Take 17 g by mouth daily.       No current facility-administered medications for this visit.      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      Review of Systems   Constitutional: Negative.   HENT: Negative.   Eyes: Negative.   Respiratory: Negative.   Cardiovascular: Negative.   Gastrointestinal: Negative.   Endocrine: Negative.   Genitourinary: Negative.   Musculoskeletal: Negative.   Skin: Negative.   Allergic/Immunologic: Negative.   Neurological: Negative.   Hematological: Negative.   Psychiatric/Behavioral: Hx of ADHD      Objective:     Physical Exam   /70 (Patient Site: Left Arm, Patient Position: Sitting, Cuff Size: Adult Regular)  Pulse 75  Wt 128 lb 11.2 oz (58.4 kg)  LMP 04/30/2017 (Approximate)  SpO2 99%  Breastfeeding? No  BMI 19.57 kg/m2    Constitutional: Alert and oriented x3, well nourished without any acute distress  HENT:   Right Ear: External ear normal.   Left Ear: External ear normal.   Nose: Nose normal.   Mouth/Throat: Oropharynx is clear and moist.   Eyes: Conjunctivae and EOM are normal. Pupils are equal, round, and reactive to light. Right eye exhibits no discharge. Left eye exhibits no discharge.   Neck: No thyromegaly present.   Lymphadenopathy: Without palpable lymphadenopathy  Cardiovascular: Normal S1 and S2. Regular rate, rhythm and no murmur, rubs or gallops. Palpable distal pulses bilaterally.  Pulmonary/Chest: Normal effort. Lungs clear to auscultation in all lobes. Without wheezing, rhonchi or crackles.    Abdominal: Soft, non tenderness. There is no rebound or guarding. Bowel sounds x4. Without organomegaly. No CVA  tenderness.  Musculoskeletal: 5/5 strength  And full ROM in all extremities. No joint swelling or deformity  Neurological: Cranial nerves intact, without deficit. Without numbness, tingling or paresthesia. Normal reflexes  Skin: Dry and intact; without rashes or lesions.   Psychiatric: Normal mood and affect.         ADHD (attention deficit hyperactivity disorder), predominantly hyperactive impulsive type  Vitals signs stable.   Continue with 30mg of Adderall working well for patient.   Reinforced risks vs benefits of medication,   Will check urine sample this today as she endorses taking medication this morning.   Discussed in detail side effects of adderall.   CSA updated today, follow up every months stable medication  Ayesha appeared pleased and agreed with plan.     Immunization due  - HPV vaccine 9 valent 3 dose IM

## 2021-06-13 NOTE — PROGRESS NOTES
Assessment/Plan:        Problem List Items Addressed This Visit     None      Visit Diagnoses     Severe episode of recurrent major depressive disorder, with psychotic features (H), improved, patient has been seen by psychiatry, oncology virtually.  She has continued with Lexapro 10 mg daily.        Relevant Medications    escitalopram oxalate (LEXAPRO) 10 MG tablet              She is doing very much better with her depression. Her psychiatrist started her on some Hydroxyzine for sleep. I am continuing with lexapro 10 mg daily as this has been very helpful for her with no side effects.     I will see her again in 6 weeks for a physical.      Subjective:    Patient ID: Ayesha Sanchez is a 25 y.o. female.    HPI   Patient has been meditating, journaling, eat healthy foods, exercising.  She sold 5 last Wednesday, as a virtual visit.  Therapist arrived as a virtual visit.  She was doing to feel better by Thursday.  Psychiatrist ordered hydroxyzine 25 mg at night, to help her with sleep.  She has not having nightmares at night.  She has been doing meditation to get to sleep.  She also uses music.    She is taking Activia.    Vitamin D very low at 7.6 November 23 thousand and 20.    The following portions of the patient's history were reviewed and updated as appropriate: allergies, current medications, past family history, past medical history, past social history, past surgical history and problem list.    Review of Systems  As above, rest of the review of symptoms is negative.  She says that she can think more clear now and is able tot alk herself out of things.      /60   Pulse 67   Wt 143 lb (64.9 kg)   SpO2 99%   BMI 22.07 kg/m      Objective:    Physical Exam  Affect improved.

## 2021-06-13 NOTE — TELEPHONE ENCOUNTER
"I called to give Ayesha her vitamin D result which has come back very low at 6.7. I left a message on her voicemail as below. I will order Ergocalciferol for her and send it to her pharmacy.     \"I am calling this is Dr. Mcguire from the [facility] I am calling just to let you know about your blood results. I will leave a message in your chart about this. You can call the clinic [phone] or you can send us a [employer] message thanks.\"    Okay to give her this information.    Josie Mcguire MD    "

## 2021-06-13 NOTE — TELEPHONE ENCOUNTER
I was a bit confused by this message but I think they need health records sent to [place]. I did leave a detailed voicemail for mom with the number to medical records so they can get whatever is needed to send records over.  Temi Leal [facility] ............... 1:36 PM, 12/08/20

## 2021-06-13 NOTE — PROGRESS NOTES
Assessment/Plan:        Problem List Items Addressed This Visit     None      Visit Diagnoses     Severe episode of recurrent major depressive disorder, with psychotic features, take the Lexapro daily and see how she does with this. (H)    -  Primary    Vitamin D deficiency, continue with high dose vitamin D for now.                  Subjective:    Patient ID: Ayesha Sanchez is a 25 y.o. female.    HPI   She has a supportive family and boyfriend. She has the number for the suicide hotline.     She has only started taking the Lexapro this morning (I ordered this 11/23) together with the vitamin D. Her vitamin D is very low at 7.6. She is taking the high doses of vitamin D.    The following portions of the patient's history were reviewed and updated as appropriate: allergies, current medications and problem list.    Review of Systems   Only started taking Lexapro this morning.        /60   Pulse 73   Wt 142 lb (64.4 kg)   SpO2 98%   BMI 21.91 kg/m      Objective:    Physical Exam          Total Time: 15 minutes. I spent 15 minutes face to face with the patient with more than 50% spent on counseling regarding depression.

## 2021-06-13 NOTE — TELEPHONE ENCOUNTER
Who is calling:  Patient mother   Reason for Call:  Patient mother states patient applied for medical disability for work she sent a detailed rolan  For transferring  Her medical record to [place] they did not received yet . Patient mother is asking who should she contact . Please call patient mother with information.  Date of last appointment with primary care: 12/07/20  Okay to leave a detailed message: No

## 2021-06-15 NOTE — PROGRESS NOTES
PHQ-9, and AUTUMN-7 were filled out by patient today.  Little interest or pleasure in doing things: Not at all  Feeling down, depressed, or hopeless: Several days  Trouble falling or staying asleep, or sleeping too much: Several days  Feeling tired or having little energy: Several days  Poor appetite or overeating: Not at all  Feeling bad about yourself - or that you are a failure or have let yourself or your family down: Several days  Trouble concentrating on things, such as reading the newspaper or watching television: More than half the days  Moving or speaking so slowly that other people could have noticed. Or the opposite - being so fidgety or restless that you have been moving around a lot more than usual: Not at all  Thoughts that you would be better off dead, or of hurting yourself in some way: Not at all  PHQ-9 Total Score: 6  If you checked off any problems, how difficult have these problems made it for you to do your work, take care of things at home, or get along with other people?: Somewhat difficult  Depression Follow-up Plan: (Psychiatry, and psychology)    AUTUMN-7 Screening Results:  No data recorded She scored 18, and results are in flowsheet.      Assessment & Plan     Ayesha was seen today for paperwork.    Diagnoses and all orders for this visit:    Severe episode of recurrent major depressive disorder, with psychotic features (H), psychotic features have improved.  Patient is currently on Lexapro 10 mg daily.  She has a psychiatrist, who she sees every 3 weeks.  She is following up with a therapist regularly.  She says they are currently working on anxiety, and getting her back to work.    Vitamin D deficiency, I have reordered high-dose vitamin D, because her vitamin D level was very low at 7.6.  -     ergocalciferol (ERGOCALCIFEROL) 1,250 mcg (50,000 unit) capsule; Take 1 capsule (50,000 Units total) by mouth once a week for 12 doses.    Anxiety, continue with Lexapro 20 mg daily, psychiatry and  psychology.      40 minutes spent on the date of the encounter doing chart review, patient visit, documentation and And filling out of [facility] paperwork.        Return in about 4 weeks (around 3/9/2021), or Follow up with Dr. Mcguire.    Josie Mcguire MD  [facility]    Sharmin Sanchez is 25 y.o. and presents to clinic today for the following health issues     HPI   Says the reason she is here today is medical paperwork for medical leave.  Forms were filled out during the clinic visit, and a copy has been sent for scanning.    Last time I saw her was December 7, 2020.  She has been attending therapy, virtually.  She was feeling better.  She has also been seen by psychiatry, and hydroxyzine 25 mg at night was ordered for her to help her sleep.  Patient continued with Lexapro 10 mg daily.  Vitamin D was very low at 7.6.  I have given her high dose of vitamin D for 12 weeks, starting November 24, 2020.  May continue with this for further 2 weeks because it was so low.    I saw her prior to this November 30, 2020.  Patient had started Lexapro November 30, 2020.  Stopped working November 25, 2020.     I saw her November 23, for a first-time visit with symptoms of depression.  Previous diagnosis of ADHD March 2015.  She scored 21 on the AUTUMN-7.    Her therapist is not working on anxiety.  Patient currently feels like things are worse.  She thinks that she is worried about returning to work which is making it worse.  She has a psychiatrist, Dr. Jonathan White, who will be seeing her tomorrow [February 10].  Lexapro was increased to 20 mg a day a couple weeks ago.  She has not picked this up yet, but has been taking the extra dose, with her current supply.    Patient does not have a car anymore.  Her mother is helping.     Review of Systems   With the review systems is negative.  Patient continues to take hydroxyzine 25 mg at night for sleep        Objective    /86    Pulse 80   Wt 148 lb (67.1 kg)   SpO2 99%   BMI 22.84 kg/m    Body mass index is 22.84 kg/m .  Physical Exam  Patient looks good, expresses anxiety.  Interactive, and alert.

## 2021-06-15 NOTE — PROGRESS NOTES
Ayesha Sanchez is a 22 y.o. female who is here for a health maintenance visit.    Ayesha has history of ADHD she was evaluated on 1/14/2016 at [facility]  And diagnosed with ADHD combined inattentive/hyperactive impulsive type.   Taking Adderall XR and she has been taking 30mg and still feels her symptoms are controlled well.  She is consistent, she is able to get her day done.   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 She is also going to school at [employer] [place] degree - 2 year degree.   Feels like going well. On the A honor roll.   Her typical symptoms are inability to focus, complete tasks and this has improved.    Denies any side effects chest pain, racing skipped heartbeat, decreased appetite, decreased weight loss.    Good support systems with parents and boyfriend, friends.    Last dose was two days ago.     Does need refill on her birth control, stable dosing denies any side effects.     Reviewed and updated chart below  No Known Allergies  Current Outpatient Prescriptions   Medication Sig Dispense Refill     dextroamphetamine-amphetamine (ADDERALL XR) 30 MG 24 hr capsule Take 1 capsule (30 mg total) by mouth daily. 30 capsule 0     LACTOBACILLUS ACIDOPHILUS (PROBIOTIC ORAL) Take by mouth daily.       TRINESSA, 28, 0.18/0.215/0.25 mg-35 mcg (28) Tab tablet TAKE 1 TABLET BY MOUTH DAILY 84 tablet 0     TRINESSA, 28, 0.18/0.215/0.25 mg-35 mcg (28) Tab tablet TAKE 1 TABLET BY MOUTH DAILY 84 tablet 1     docusate sodium (COLACE) 50 MG capsule Take by mouth 2 (two) times a day.       polyethylene glycol (MIRALAX) 17 gram packet Take 17 g by mouth daily.       No current facility-administered medications for this visit.      Past Medical History:   Diagnosis Date     ADHD (attention deficit hyperactivity disorder)      Constipation      Past Surgical History:   Procedure Laterality Date     NE DENTAL SURGERY PROCEDURE       "Description: Oral Surgery Tooth Extraction;  Recorded: 01/12/2014;  Comments: [place] teeth     Social History     Social History     Marital status: Single     Spouse name: N/A     Number of children: 0     Years of education: N/A     Occupational History           [employer], business     Social History Main Topics     Smoking status: Never Smoker     Smokeless tobacco: Never Used     Alcohol use No     Drug use: No     Sexual activity: Yes     Partners: Male     Birth control/ protection: OCP     Other Topics Concern     Not on file     Social History Narrative     No family history on file.      Review of Systems   Constitutional: Negative.   HENT: Negative.   Eyes: Negative.   Respiratory: Negative.   Cardiovascular: Negative.   Gastrointestinal: Negative.   Endocrine: Negative.   Genitourinary: Negative.   Musculoskeletal: Negative.   Skin: Negative.   Allergic/Immunologic: Negative.   Neurological: Negative.   Hematological: Negative.   Psychiatric/Behavioral: Negative.       Objective:     Physical Exam   /60 (Patient Site: Left Arm, Patient Position: Sitting, Cuff Size: Adult Regular)  Pulse 77  Ht 5' 8\" (1.727 m)  Wt 148 lb 8 oz (67.4 kg)  SpO2 99%  BMI 22.58 kg/m2    Constitutional: Alert and oriented x3, well nourished without any acute distress  HENT:   Right Ear: External ear normal.   Left Ear: External ear normal.   Nose: Nose normal.   Mouth/Throat: Oropharynx is clear and moist.   Eyes: Conjunctivae and EOM are normal. Pupils are equal, round, and reactive to light. Right eye exhibits no discharge. Left eye exhibits no discharge.   Neck: No thyromegaly present.   Lymphadenopathy: Without palpable lymphadenopathy  Cardiovascular: Normal S1 and S2. Regular rate, rhythm and no murmur, rubs or gallops. Palpable distal pulses bilaterally.  Pulmonary/Chest: Normal effort. Lungs clear to auscultation in all lobes. Without wheezing, rhonchi or crackles.    Abdominal: Soft, non tenderness. There " is no rebound or guarding. Bowel sounds x4. Without organomegaly. No CVA tenderness.  Musculoskeletal: 5/5 strength  And full ROM in all extremities. No joint swelling or deformity  Neurological: Cranial nerves intact, without deficit. Without numbness, tingling or paresthesia. Normal reflexes  Skin: Dry and intact; without rashes or lesions.   Psychiatric: Normal mood and affect.   : External female genitalia without lesions. Introitus is normal, vaginal walls pink and moist without lesions. There is no cervical motion tenderness and the adnexa are without masses. There is no abnormal discharge from the cervix.   Breast: No chest deformity, asymmetry. Normal contours. Without nodules, masses, tenderness, or axillary adenopathy. No nipple discharge or dimpling noted.     1. Health care maintenance  Will order future labs and follow up with results once received  Pap smear completed today  Declined c/g, HPV vaccines today.   We discussed healthy lifestyle, nutrition, cardiovascular risk reduction, self care, safety, self breast exams, sunscreen, and seatbelt screening.  Recommended annual physical exam or sooner any acute concerns.     - Gynecologic Cytology (PAP Smear)  - Lipid Cascade; Future  - Comprehensive Metabolic Panel; Future  - Vitamin D, Total (25-Hydroxy); Future    2. ADHD (attention deficit hyperactivity disorder), predominantly hyperactive impulsive type  Vitals signs stable.   Continue with 30mg of Adderall working well for patient.   Reinforced risks vs benefits of medication  Discussed in detail side effects of adderall.   CSA up to date  Ayesha appeared pleased and agreed with plan.     - dextroamphetamine-amphetamine (ADDERALL XR) 30 MG 24 hr capsule; Take 1 capsule (30 mg total) by mouth daily.  Dispense: 30 capsule; Refill: 0    3. Contraceptive management  Stable, will refill birth control today  Reinforced risks vs benefit.   Discussed with patient to follow up if worsening symptoms,  questions or concerns  Discussed red flags that would warrant urgent evaluation. Patient verbalized understanding.  - norgestimate-ethinyl estradiol (TRINESSA, 28,) 0.18/0.215/0.25 mg-35 mcg (28) Tab tablet; TAKE 1 TABLET BY MOUTH DAILY  Dispense: 84 tablet; Refill: 2

## 2021-06-15 NOTE — PROGRESS NOTES
Assessment/Plan:        Diagnoses and associated orders for this visit:    ADHD (attention deficit hyperactivity disorder), predominantly hyperactive impulsive type  Vitals signs stable.  Stable weight  Wt Readings from Last 3 Encounters:   02/01/18 148 lb 8 oz (67.4 kg)   06/30/17 129 lb (58.5 kg)   05/31/17 128 lb 11.2 oz (58.4 kg)     CSA up to date  Will stay with 30mg of Adderal. Refill provided today  Reinforced risks vs benefits and side effects of adderall.   She does plan to follow up in 4 weeks for fasting physical exam, I will complete updated labs at that time.   Ayesha appeared pleased and agreed with plan.   - dextroamphetamine-amphetamine (ADDERALL XR) 30 MG 24 hr capsule; Take 1 capsule (30 mg total) by mouth daily.  Dispense: 30 capsule; Refill: 0    Recommended a fasting physical exam in  4 weeks. Ayesha did agree with plan.         2. Contraceptive management  Stable, will refill birth control today  Reinforced risks vs benefit.   She is due for annual physical exam, she agreed to complete prior to any further refills.   Discussed red flags that would warrant urgent evaluation. Patient verbalized understanding.    - norgestimate-ethinyl estradiol (TRINESSA, 28,) 0.18/0.215/0.25 mg-35 mcg (28) Tab tablet; TAKE 1 TABLET BY MOUTH DAILY  Dispense: 84 tablet; Refill: 0    Subjective:    Patient ID: Ayesha Sanchez is a 22 y.o. female.    HPI Comments: Ayesha presents today for follow up ADHD evaluation, she was evaluated on 1/14/2016 at [facility]  And diagnosed with ADHD combined inattentive/hyperactive impulsive type.    Presents today for follow up,  Adderall XR and she has been taking 30mg and this has been over one year stable dose.    Her typical symptoms are inability to focus, complete tasks and this has all improved.   Denies any side effects chest pain, racing skipped heartbeat, decreased appetite, decreased weight loss.     Good support systems with parents and boyfriend,  friends. She has moved in with her boyfriend.   She is going to school at [employer] and working two jobs.   She has not taken medication in past two days.     The following portions of the patient's history were reviewed and updated as appropriate: allergies, current medications, past family history, past medical history, past social history, past surgical history and problem list.    Review of Systems   Constitutional: Negative.    Respiratory: Negative.    Cardiovascular: Negative.    Psychiatric/Behavioral: History of decreased concentration. Negative for suicidal ideas, sleep disturbance and self-injury. The patient is not nervous/anxious.              Objective:    Physical Exam   Constitutional: She is oriented to person, place, and time. She appears well-developed and well-nourished. No distress.   Cardiovascular: Normal rate and normal heart sounds.    No murmur heard.  Pulmonary/Chest: Effort normal and breath sounds normal. No respiratory distress.   Neurological: She is alert and oriented to person, place, and time.   Skin: She is not diaphoretic.   Psychiatric: She has a normal mood and affect. Her behavior is normal.            Vitals:    02/01/18 1137   BP: 110/60   Pulse: 77   SpO2: 99%       Current Outpatient Prescriptions   Medication Sig Dispense Refill     dextroamphetamine-amphetamine (ADDERALL XR) 30 MG 24 hr capsule Take 1 capsule (30 mg total) by mouth daily. 30 capsule 0     LACTOBACILLUS ACIDOPHILUS (PROBIOTIC ORAL) Take by mouth daily.       norgestimate-ethinyl estradiol (TRINESSA, 28,) 0.18/0.215/0.25 mg-35 mcg (28) Tab tablet TAKE 1 TABLET BY MOUTH DAILY 84 tablet 0     TRINESSA, 28, 0.18/0.215/0.25 mg-35 mcg (28) Tab tablet TAKE 1 TABLET BY MOUTH DAILY 84 tablet 1     docusate sodium (COLACE) 50 MG capsule Take by mouth 2 (two) times a day.       polyethylene glycol (MIRALAX) 17 gram packet Take 17 g by mouth daily.       No current facility-administered medications for  this visit.

## 2021-06-16 NOTE — TELEPHONE ENCOUNTER
Telephone Encounter by Davina Cid at 6/5/2019  2:23 PM     Author: Davina Cid Service: -- Author Type: --    Filed: 6/5/2019  2:24 PM Encounter Date: 6/5/2019 Status: Signed    : Davina Cid APPROVED:    Approval start date:05/06/2019  Approval end date:06/04/2022    Pharmacy has been notified of approval and will contact patient when medication is ready for pickup.

## 2021-06-16 NOTE — TELEPHONE ENCOUNTER
Telephone Encounter by Tasha Naylor at 5/28/2019  1:16 PM     Author: Tasha Naylor Service: -- Author Type: --    Filed: 5/28/2019  1:18 PM Encounter Date: 5/24/2019 Status: Addendum    : Tasha Naylor    Related Notes: Original Note by Tasha Naylor filed at 5/28/2019  1:17 PM       PRIOR AUTHORIZATION DENIED    Denial Rational:  Diagnosis of ADHD not an accepted diagnosis.  Per call to [facility] this medication is approved for diagnosis of excessive sleepiness due to shift work disorder, Narcolepsy, or TEDDY          Appeal Information: This medication was denied. If physician would like to appeal because patient has contraindication or allergy to covered medication please write letter of medical necessity and route back to PA team to initiate.  If no further action is needed please close encounter thank you.

## 2021-06-17 NOTE — TELEPHONE ENCOUNTER
RN cannot approve Refill Request    RN can NOT refill this medication med is not covered by policy/route to provider. Last office visit: 2/9/2021 Josie Mcguire MD Last Physical: Visit date not found Last MTM visit: Visit date not found Last visit same specialty: 2/9/2021 Josie Mcguire MD.  Next visit within 3 mo: Visit date not found  Next physical within 3 mo: Visit date not found      Mehnaz Sharpe, [facility] Triage/Med Refill 5/1/2021    Requested Prescriptions   Pending Prescriptions Disp Refills     ergocalciferol (ERGOCALCIFEROL) 1,250 mcg (50,000 unit) capsule [Pharmacy Med Name: VITAMIN D2 1.25MG(50,000 UNIT)] 12 capsule 0     Sig: TAKE 1 CAPSULE (50,000 UNITS TOTAL) BY MOUTH ONCE A WEEK FOR 12 DOSES.       There is no refill protocol information for this order

## 2021-06-17 NOTE — TELEPHONE ENCOUNTER
Left voicemail for patient to return call to clinic. When patient returns call, please give them below message.    Temi Leal CMA ............... 11:40 AM, 05/13/21

## 2021-06-17 NOTE — TELEPHONE ENCOUNTER
Ayesha called back, TC relay provider message.  She was not aware that this refill was called in and already had her supply of OTC purchased and was not needing the RX.

## 2021-06-17 NOTE — TELEPHONE ENCOUNTER
Vitamin D, Total (25-Hydroxy)   Date Value Ref Range Status   11/23/2020 7.6 (L) 30.0 - 80.0 ng/mL Final     I think she has had two courses of the high dose vitamin D, so she can take 5000 units every day now, which she can get OTC.    Thank you.    Josie Mcguire MD

## 2021-06-19 NOTE — LETTER
Letter by Wale Arevalo MD at      Author: Wale Arevalo MD Service: -- Author Type: --    Filed:  Encounter Date: 5/23/2019 Status: (Other)         [facility]/OB  05/23/19    Patient: Ayesha Sanchez  YOB: 1995  Medical Record Number: [id number]  CSN: [id number]                                                                              Non-opioid Controlled Substance Agreement    I understand that my care provider has prescribed a controlled substance to help manage my condition(s). I am taking this medicine to help me function or work. I know this is strong medicine, and that it can cause serious side effects. Controlled substances can be sedating, addicting and may cause a dependency on the drug. They can affect my ability to drive or think, and cause depression. They need to be taken exactly as prescribed. Combining controlled substances with certain medicines or chemicals (such as cocaine, sedatives and tranquilizers, sleeping pills, meth) can be dangerous or even fatal. Also, if I stop controlled substances suddenly, I may have severe withdrawal symptoms.  If not helpful, I may be asked to stop them.    The risks, benefits, and side effects of these medicine(s) were explained to me. I agree that:    1. I will take part in other treatments as advised by my care team. This may be psychiatry or counseling, physical therapy, behavioral therapy, group treatment or a referral to a pain clinic. I will reduce or stop my medicine when my care team tells me to do so.  2. I will take my medicines as prescribed. I will not change the dose or schedule unless my care team tells me to. There will be no refills if I run out early.  I may be contactedwithout warning and asked to complete a urine drug test or pill count at any time.   3. I will keep all my appointments, and understand this is part of the monitoring of controlled substances. My care team may require an office visit for EVERY  controlled substance refill. If I miss appointments or dont follow instructions, my care team may stop my medicine.  4. I will not ask other providers to prescribe controlled substances, and I will not accept controlled substances from other people. If I need another prescribed controlled substance for a new reason, I will tell my care team within 1 business day.  5. I will use one pharmacy to fill all of my controlled substance prescriptions, and it is up to me to make sure that I do not run out of my medicines on weekends or holidays. If my care team is willing to refill my controlled substance prescription without a visit, I must request refills only during office hours, refills may take up to 3 days to process, and it may take up to 5 to 7 days for my medicine to be mailed and ready at my pharmacy. Prescriptions will not be mailed anywhere except my pharmacy.    6. I am responsible for my prescriptions. If the medicine/prescription is lost or stolen, it will not be replaced. I also agree not to share controlled substance medicines with anyone.          [facility] MEDICINE/OB  05/23/19  Patient:  Ayesha Sanchez  YOB: 1995  Medical Record Number: [id number]  CSN: [id number]    7. I agree to not use ANY illegal or recreational drugs. This includes marijuana, cocaine, bath salts or other drugs. I agree not to use alcohol unless my care team says I may. I agree to give urine samples whenever asked. If I dont give a urine sample, the care team may stop my medicine.    8. If I enroll in the [place] Medical Marijuana program, I will tell my care team. I will also sign an agreement to share my medical records with my care team.    9. I will bring in my list of medicines (or my medicine bottles) each time I come to the clinic.   10. I will tell my care team right away if I become pregnant or have a new medical problem treated outside of my regular clinic.  11. I understand that this medicine can  affect my thinking and judgment. It may be unsafe for me to drive, use machinery and do dangerous tasks. I will not do any of these things until I know how the medicine affects me. If my dose changes, I will wait to see how it affects me. I will contact my care team if I have concerns about medicine side effects.    I understand that if I do not follow any of the conditions above, my prescriptions or treatment may be stopped.      I agree that my provider, clinic care team, and pharmacy may work with any city, state or federal law enforcement agency that investigates the misuse, sale, or other diversion of my controlled medicine. I will allow my provider to discuss my care with or share a copy of this agreement with any other treating provider, pharmacy or emergency room where I receive care. I agree to give up (waive) any right of privacy or confidentiality with respect to these consents.   I have read this agreement and have asked questions about anything I did not understand.    ___________________________________________________________________________  Patient signature - Date/Time  -Ayesha Sanchez                                      ___________________________________________________________________________  Witness signature                                                                    ___________________________________________________________________________  Provider signature- Wale Arevalo MD

## 2021-06-26 ENCOUNTER — HEALTH MAINTENANCE LETTER (OUTPATIENT)
Age: 26
End: 2021-06-26
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
## 2021-10-16 ENCOUNTER — HEALTH MAINTENANCE LETTER (OUTPATIENT)
Age: 26
End: 2021-10-16

## 2022-07-23 ENCOUNTER — HEALTH MAINTENANCE LETTER (OUTPATIENT)
Age: 27
End: 2022-07-23

## 2022-10-01 ENCOUNTER — HEALTH MAINTENANCE LETTER (OUTPATIENT)
Age: 27
End: 2022-10-01

## 2022-10-31 ENCOUNTER — OFFICE VISIT (OUTPATIENT)
Dept: FAMILY MEDICINE | Facility: CLINIC | Age: 27
End: 2022-10-31
Payer: COMMERCIAL

## 2022-10-31 VITALS
HEART RATE: 83 BPM | HEIGHT: 67 IN | BODY MASS INDEX: 26.37 KG/M2 | RESPIRATION RATE: 20 BRPM | SYSTOLIC BLOOD PRESSURE: 123 MMHG | DIASTOLIC BLOOD PRESSURE: 80 MMHG | WEIGHT: 168 LBS | TEMPERATURE: 99.6 F

## 2022-10-31 DIAGNOSIS — N63.24 MASS OF LOWER INNER QUADRANT OF LEFT BREAST: Primary | ICD-10-CM

## 2022-10-31 PROCEDURE — 99213 OFFICE O/P EST LOW 20 MIN: CPT | Performed by: STUDENT IN AN ORGANIZED HEALTH CARE EDUCATION/TRAINING PROGRAM

## 2022-10-31 RX ORDER — METHYLPHENIDATE HYDROCHLORIDE 36 MG/1
36 TABLET, EXTENDED RELEASE ORAL
COMMUNITY
End: 2023-02-02

## 2022-10-31 RX ORDER — ARIPIPRAZOLE 10 MG/1
10 TABLET ORAL DAILY
COMMUNITY

## 2022-10-31 RX ORDER — DEXTROAMPHETAMINE SACCHARATE, AMPHETAMINE ASPARTATE MONOHYDRATE, DEXTROAMPHETAMINE SULFATE AND AMPHETAMINE SULFATE 6.25; 6.25; 6.25; 6.25 MG/1; MG/1; MG/1; MG/1
25 CAPSULE, EXTENDED RELEASE ORAL DAILY
COMMUNITY
End: 2023-02-02

## 2022-10-31 ASSESSMENT — PATIENT HEALTH QUESTIONNAIRE - PHQ9
10. IF YOU CHECKED OFF ANY PROBLEMS, HOW DIFFICULT HAVE THESE PROBLEMS MADE IT FOR YOU TO DO YOUR WORK, TAKE CARE OF THINGS AT HOME, OR GET ALONG WITH OTHER PEOPLE: SOMEWHAT DIFFICULT
SUM OF ALL RESPONSES TO PHQ QUESTIONS 1-9: 4
SUM OF ALL RESPONSES TO PHQ QUESTIONS 1-9: 4

## 2022-10-31 NOTE — PROGRESS NOTES
Assessment & Plan     Mass of lower inner quadrant of left breast  Palpable mass in the left breast.  Mobile.  Risk for malignancy based on history.  Low suspicion for malignancy.  Will order ultrasound investigate.  - US Breast Left Limited 1-3 Quadrants          Return if symptoms worsen or fail to improve.    Eli Mcgrath MD  [facility]    Sharmin Almonte is a 27 year old accompanied by her self, presenting for the following health issues:  office visit (Breast pain and lump )      History of Present Illness       Reason for visit:  Breast exam  Symptom onset:  More than a month  Symptoms include:  Breast pain and found lump recently  Symptom intensity:  Mild  Symptom progression:  Staying the same  Had these symptoms before:  No  What makes it worse:  The fear of what it might be  What makes it better:  Getting it checked    She eats 0-1 servings of fruits and vegetables daily.She consumes 1 sweetened beverage(s) daily.She exercises with enough effort to increase her heart rate 60 or more minutes per day.  She exercises with enough effort to increase her heart rate 5 days per week.   She is taking medications regularly.    Today's PHQ-9         PHQ-9 Total Score: 4    PHQ-9 Q9 Thoughts of better off dead/self-harm past 2 weeks :   Not at all    How difficult have these problems made it for you to do your work, take care of things at home, or get along with other people: Somewhat difficult       Breast lump characteristics   Changes in size over time - same   Change relative to menstrual cycle - LMP just ended 2 days ago, not sure   Duration of mass - 1 month   Pain or swelling - burning pain in the left side of left breast and bottom, not visible swelling, but does feel a lump on the right side of the left breast.    Redness, fever, or discharge - none   Diet and medications   Current medications - was recently diagnosed with bipolar 1 - ability, lamotrigine, hydroxyzine as needed -  "stopped it 2/2 concern for CP as a side effect, adderall - Abilify, lamotrigine, and adderall started within the last few months.    History of hormone therapy - Only on birth control in college from 15 to 22yo.    Family history   History of breast disease - denies         Medical and surgical history   Personal history of breast cancer - denies   Previous breast masses and biopsies - none   Recent breast trauma or surgery - deneis   Recent radiation therapy or chemotherapy - denies   Other exposure to radiation - denies   Personal characteristics   Age at first childbearing - none   Age at menarche - 10-11   Age at menopause - N/A   Current age - 27   Current lactation status - not breastfeeding   History of breastfeeding - none   Number of children - none   Social history   Radiation and chemical exposure - none   Smoking - MJ weed gummies, never smoked cigarettes, did smoke MJ, but stopped.      - Alcohol - 2 drinks per week.   - bump on the right side of left breast doesn't feel super hard, feels long and small.              Review of Systems   Constitutional, HEENT, cardiovascular, pulmonary, gi and gu systems are negative, except as otherwise noted.      Objective    /80 (BP Location: Left arm, Patient Position: Sitting, Cuff Size: Adult Regular)   Pulse 83   Temp 99.6  F (37.6  C) (Temporal)   Resp 20   Ht 1.71 m (5' 7.32\")   Wt 76.2 kg (168 lb)   LMP 10/29/2022   BMI 26.06 kg/m    Body mass index is 26.06 kg/m .  Physical Exam   PHYSICAL EXAM  General: Well developed, well nourished.  Skin:  Dry without rash.    Head:  Normocephalic-atraumatic.    Eye:  Normal conjunctivae.     Respiratory:  Normal respiratory effort.   Breast: palpable mass located at 8oclock, 2cm away nipple, 1 cm long and 0.5cm wide, mildy tender to palpation. Mobile.  Gastrointestinal:  Non-distended.    Musculoskeletal:  No deformity or edema.  Neurologic: No focal deficits.              "

## 2022-11-03 ENCOUNTER — ANCILLARY PROCEDURE (OUTPATIENT)
Dept: MAMMOGRAPHY | Facility: CLINIC | Age: 27
End: 2022-11-03
Attending: STUDENT IN AN ORGANIZED HEALTH CARE EDUCATION/TRAINING PROGRAM
Payer: COMMERCIAL

## 2022-11-03 DIAGNOSIS — N63.24 MASS OF LOWER INNER QUADRANT OF LEFT BREAST: ICD-10-CM

## 2022-11-03 PROCEDURE — 76642 ULTRASOUND BREAST LIMITED: CPT | Mod: LT | Performed by: RADIOLOGY

## 2023-02-01 ASSESSMENT — ENCOUNTER SYMPTOMS
FREQUENCY: 0
PALPITATIONS: 1
PARESTHESIAS: 0
DIZZINESS: 0
HEARTBURN: 0
HEMATURIA: 0
DIARRHEA: 1
CONSTIPATION: 0
MYALGIAS: 0
HEADACHES: 0
DYSURIA: 0
HEMATOCHEZIA: 0
ARTHRALGIAS: 0
NAUSEA: 0
ABDOMINAL PAIN: 0
EYE PAIN: 0
BREAST MASS: 1
FEVER: 0
NERVOUS/ANXIOUS: 1
COUGH: 0
JOINT SWELLING: 0
SORE THROAT: 0
CHILLS: 0
SHORTNESS OF BREATH: 0
WEAKNESS: 0

## 2023-02-02 ENCOUNTER — OFFICE VISIT (OUTPATIENT)
Dept: FAMILY MEDICINE | Facility: CLINIC | Age: 28
End: 2023-02-02
Payer: COMMERCIAL

## 2023-02-02 VITALS
SYSTOLIC BLOOD PRESSURE: 106 MMHG | HEART RATE: 73 BPM | OXYGEN SATURATION: 97 % | HEIGHT: 68 IN | RESPIRATION RATE: 20 BRPM | TEMPERATURE: 98.1 F | WEIGHT: 164 LBS | BODY MASS INDEX: 24.86 KG/M2 | DIASTOLIC BLOOD PRESSURE: 73 MMHG

## 2023-02-02 DIAGNOSIS — Z11.59 NEED FOR HEPATITIS C SCREENING TEST: ICD-10-CM

## 2023-02-02 DIAGNOSIS — Z00.00 ROUTINE GENERAL MEDICAL EXAMINATION AT A HEALTH CARE FACILITY: Primary | ICD-10-CM

## 2023-02-02 DIAGNOSIS — N76.0 BV (BACTERIAL VAGINOSIS): ICD-10-CM

## 2023-02-02 DIAGNOSIS — B96.89 BV (BACTERIAL VAGINOSIS): ICD-10-CM

## 2023-02-02 DIAGNOSIS — Z12.4 CERVICAL CANCER SCREENING: ICD-10-CM

## 2023-02-02 DIAGNOSIS — F90.1 ADHD (ATTENTION DEFICIT HYPERACTIVITY DISORDER), PREDOMINANTLY HYPERACTIVE IMPULSIVE TYPE: ICD-10-CM

## 2023-02-02 DIAGNOSIS — Z11.4 SCREENING FOR HIV (HUMAN IMMUNODEFICIENCY VIRUS): ICD-10-CM

## 2023-02-02 LAB
ANION GAP SERPL CALCULATED.3IONS-SCNC: 13 MMOL/L (ref 7–15)
BUN SERPL-MCNC: 7.5 MG/DL (ref 6–20)
CALCIUM SERPL-MCNC: 9.4 MG/DL (ref 8.6–10)
CHLORIDE SERPL-SCNC: 103 MMOL/L (ref 98–107)
CHOLEST SERPL-MCNC: 180 MG/DL
CLUE CELLS: PRESENT
CREAT SERPL-MCNC: 0.82 MG/DL (ref 0.51–0.95)
DEPRECATED HCO3 PLAS-SCNC: 24 MMOL/L (ref 22–29)
ERYTHROCYTE [DISTWIDTH] IN BLOOD BY AUTOMATED COUNT: 13 % (ref 10–15)
GFR SERPL CREATININE-BSD FRML MDRD: >90 ML/MIN/1.73M2
GLUCOSE SERPL-MCNC: 101 MG/DL (ref 70–99)
HCT VFR BLD AUTO: 40.2 % (ref 35–47)
HDLC SERPL-MCNC: 64 MG/DL
HGB BLD-MCNC: 12.9 G/DL (ref 11.7–15.7)
LDLC SERPL CALC-MCNC: 106 MG/DL
MCH RBC QN AUTO: 29.6 PG (ref 26.5–33)
MCHC RBC AUTO-ENTMCNC: 32.1 G/DL (ref 31.5–36.5)
MCV RBC AUTO: 92 FL (ref 78–100)
NONHDLC SERPL-MCNC: 116 MG/DL
PLATELET # BLD AUTO: 244 10E3/UL (ref 150–450)
POTASSIUM SERPL-SCNC: 4.3 MMOL/L (ref 3.4–5.3)
RBC # BLD AUTO: 4.36 10E6/UL (ref 3.8–5.2)
SODIUM SERPL-SCNC: 140 MMOL/L (ref 136–145)
TRICHOMONAS, WET PREP: ABNORMAL
TRIGL SERPL-MCNC: 51 MG/DL
TSH SERPL DL<=0.005 MIU/L-ACNC: 1.17 UIU/ML (ref 0.3–4.2)
WBC # BLD AUTO: 5.1 10E3/UL (ref 4–11)
WBC'S/HIGH POWER FIELD, WET PREP: ABNORMAL
YEAST, WET PREP: ABNORMAL

## 2023-02-02 PROCEDURE — 82306 VITAMIN D 25 HYDROXY: CPT | Performed by: FAMILY MEDICINE

## 2023-02-02 PROCEDURE — 36415 COLL VENOUS BLD VENIPUNCTURE: CPT | Performed by: FAMILY MEDICINE

## 2023-02-02 PROCEDURE — 84443 ASSAY THYROID STIM HORMONE: CPT | Performed by: FAMILY MEDICINE

## 2023-02-02 PROCEDURE — G0145 SCR C/V CYTO,THINLAYER,RESCR: HCPCS | Performed by: FAMILY MEDICINE

## 2023-02-02 PROCEDURE — 99395 PREV VISIT EST AGE 18-39: CPT | Performed by: FAMILY MEDICINE

## 2023-02-02 PROCEDURE — 86803 HEPATITIS C AB TEST: CPT | Performed by: FAMILY MEDICINE

## 2023-02-02 PROCEDURE — 80048 BASIC METABOLIC PNL TOTAL CA: CPT | Performed by: FAMILY MEDICINE

## 2023-02-02 PROCEDURE — 87210 SMEAR WET MOUNT SALINE/INK: CPT | Performed by: FAMILY MEDICINE

## 2023-02-02 PROCEDURE — 80061 LIPID PANEL: CPT | Performed by: FAMILY MEDICINE

## 2023-02-02 PROCEDURE — 87389 HIV-1 AG W/HIV-1&-2 AB AG IA: CPT | Performed by: FAMILY MEDICINE

## 2023-02-02 PROCEDURE — 85027 COMPLETE CBC AUTOMATED: CPT | Performed by: FAMILY MEDICINE

## 2023-02-02 RX ORDER — LAMOTRIGINE 100 MG/1
TABLET ORAL
COMMUNITY
Start: 2023-01-19

## 2023-02-02 RX ORDER — METRONIDAZOLE 500 MG/1
500 TABLET ORAL 2 TIMES DAILY
Qty: 14 TABLET | Refills: 0 | Status: SHIPPED | OUTPATIENT
Start: 2023-02-02 | End: 2023-02-09

## 2023-02-02 RX ORDER — DEXTROAMPHETAMINE SACCHARATE, AMPHETAMINE ASPARTATE MONOHYDRATE, DEXTROAMPHETAMINE SULFATE AND AMPHETAMINE SULFATE 7.5; 7.5; 7.5; 7.5 MG/1; MG/1; MG/1; MG/1
CAPSULE, EXTENDED RELEASE ORAL
COMMUNITY
Start: 2023-01-19

## 2023-02-02 ASSESSMENT — ENCOUNTER SYMPTOMS
HEMATURIA: 0
NERVOUS/ANXIOUS: 1
ARTHRALGIAS: 0
CHILLS: 0
FREQUENCY: 0
DIZZINESS: 0
CONSTIPATION: 0
WEAKNESS: 0
HEMATOCHEZIA: 0
HEARTBURN: 0
PALPITATIONS: 1
HEADACHES: 0
ABDOMINAL PAIN: 0
COUGH: 0
JOINT SWELLING: 0
MYALGIAS: 0
BREAST MASS: 1
DYSURIA: 0
DIARRHEA: 1
PARESTHESIAS: 0
FEVER: 0
SORE THROAT: 0
SHORTNESS OF BREATH: 0
NAUSEA: 0
EYE PAIN: 0

## 2023-02-02 NOTE — PROGRESS NOTES
SUBJECTIVE:   CC: Suzy is an 27 year old who presents for preventive health visit.     Here for annual physical and pap smear.   No new concerns   No concerns for STDs. With same partner for last ten years.   Would like to check some blood work for her cholesterol.   Is working with psychiatry for her history of ADHD, bipolar, no concerns.     Patient has been advised of split billing requirements and indicates understanding: Yes  Healthy Habits:     Getting at least 3 servings of Calcium per day:  NO    Bi-annual eye exam:  NO    Dental care twice a year:  NO    Sleep apnea or symptoms of sleep apnea:  None    Diet:  Breakfast skipped    Frequency of exercise:  2-3 days/week    Duration of exercise:  Less than 15 minutes    Taking medications regularly:  No    Barriers to taking medications:  Problems remembering to take them    PHQ-2 Total Score: 0    Additional concerns today:  Yes        Today's PHQ-2 Score:   PHQ-2 ( 1999 [employer]) 2/1/2023   Q1: Little interest or pleasure in doing things 0   Q2: Feeling down, depressed or hopeless 0   PHQ-2 Score 0   Q1: Little interest or pleasure in doing things Not at all   Q2: Feeling down, depressed or hopeless Not at all   PHQ-2 Score 0       Have you ever done Advance Care Planning? (For example, a Health Directive, POLST, or a discussion with a medical provider or your loved ones about your wishes): No, advance care planning information given to patient to review.  Patient declined advance care planning discussion at this time.    Social History     Tobacco Use     Smoking status: Never     Smokeless tobacco: Never   Substance Use Topics     Alcohol use: Yes     Alcohol Use 2/1/2023   Prescreen: >3 drinks/day or >7 drinks/week? No     Reviewed orders with patient.  Reviewed health maintenance and updated orders accordingly - Yes  Lab work is in process    Breast Cancer Screening:        History of abnormal Pap smear: NO - age 21-29 PAP every 3 years recommended  PAP  / HPV Latest Ref Rng & Units 2/2/2023 11/4/2016   PAP   Negative for Intraepithelial Lesion or Malignancy (NILM) Negative for squamous intraepithelial lesion or malignancy  Electronically signed by Hannah Young CT ([facility]) on 11/10/2016 at  9:39 AM       Reviewed and updated as needed this visit by clinical staff   Tobacco  Allergies  Meds   Med Hx  Surg Hx  Fam Hx  Soc Hx        Reviewed and updated as needed this visit by Provider   Tobacco     Med Hx  Surg Hx  Fam Hx  Soc Hx       Past Medical History:   Diagnosis Date     ADHD (attention deficit hyperactivity disorder)      Anxiety      Bipolar disorder (H)      Constipation      Depression      Disorder of thyroid     clicking-seeing ENT     TMJ (temporomandibular joint syndrome)       Past Surgical History:   Procedure Laterality Date     UT DENTAL SURGERY PROCEDURE      Description: Oral Surgery Tooth Extraction;  Recorded: 01/12/2014;  Comments: [place] teeth     OB History   No obstetric history on file.       Review of Systems   Constitutional: Negative for chills and fever.   HENT: Positive for congestion and ear pain. Negative for hearing loss and sore throat.    Eyes: Negative for pain and visual disturbance.   Respiratory: Negative for cough and shortness of breath.    Cardiovascular: Positive for palpitations. Negative for chest pain and peripheral edema.   Gastrointestinal: Positive for diarrhea. Negative for abdominal pain, constipation, heartburn, hematochezia and nausea.   Breasts:  Positive for tenderness and breast mass. Negative for discharge.   Genitourinary: Negative for dysuria, frequency, genital sores, hematuria, pelvic pain, urgency, vaginal bleeding and vaginal discharge.   Musculoskeletal: Negative for arthralgias, joint swelling and myalgias.   Skin: Negative for rash.   Neurological: Negative for dizziness, weakness, headaches and paresthesias.   Psychiatric/Behavioral: Negative for mood changes. The patient is  "nervous/anxious.      OBJECTIVE:   /73 (BP Location: Left arm, Patient Position: Sitting, Cuff Size: Adult Regular)   Pulse 73   Temp 98.1  F (36.7  C) (Temporal)   Resp 20   Ht 1.72 m (5' 7.72\")   Wt 74.4 kg (164 lb)   LMP 12/25/2022 (Exact Date)   SpO2 97%   BMI 25.14 kg/m    Physical Exam  GENERAL: healthy, alert and no distress  NECK: no adenopathy, no asymmetry, masses, or scars and thyroid normal to palpation  RESP: lungs clear to auscultation - no rales, rhonchi or wheezes  BREAST: normal without masses, tenderness or nipple discharge and no palpable axillary masses or adenopathy  CV: regular rate and rhythm, normal S1 S2, no S3 or S4, no murmur, click or rub, no peripheral edema and peripheral pulses strong  ABDOMEN: soft, nontender, no hepatosplenomegaly, no masses and bowel sounds normal   (female): normal female external genitalia, normal urethral meatus, vaginal mucosa, normal cervix/adnexa/uterus without masses but a lot of yellow thin discharge and is somewhat tender with pap  MS: no gross musculoskeletal defects noted, no edema  NEURO: Normal strength and tone, mentation intact and speech normal  PSYCH: mentation appears normal, affect normal/bright    Diagnostic Test Results:  Labs reviewed in Epic  No results found for this or any previous visit (from the past 24 hour(s)).    ASSESSMENT/PLAN:   (Z00.00) Routine general medical examination at a health care facility  (primary encounter diagnosis)  Comment:   Plan: Wet prep - Clinic Collect, Vitamin D         Deficiency, TSH with free T4 reflex, CBC with         platelets, Lipid Profile (Chol, Trig, HDL, LDL         calc), Basic metabolic panel  (Ca, Cl, CO2,         Creat, Gluc, K, Na, BUN), CANCELED: Chlamydia &        Gonorrhea by PCR, GICH/Range - Clinic Collect    (Z11.4) Screening for HIV (human immunodeficiency virus)  Plan: HIV Antigen Antibody Combo  (Z11.59) Need for hepatitis C screening test  Plan: Hepatitis C Screen Reflex " "to HCV RNA Quant and         Genotype    (Z12.4) Cervical cancer screening  Plan: Pap Screen reflex to HPV if ASCUS - recommend         age 25 - 29, HPV Hold (Lab Only)    (F90.1) ADHD (attention deficit hyperactivity disorder), predominantly hyperactive impulsive type  (N76.0,  B96.89) BV (bacterial vaginosis)  Plan: metroNIDAZOLE (FLAGYL) 500 MG tablet   EHR reviewed.   Past medical history, problem list, past surgical history, family history, social history, medications reviewed, updated, reconciled.   Pap smear collected. Wet prep collected. Treated for bacterial vaginosis. Declined STD screening.   Blood work collected. Will follow.   Follow up with mental health provider as directed.   Declines immunizations.       Patient has been advised of split billing requirements and indicates understanding: Yes      COUNSELING:  Reviewed preventive health counseling, as reflected in patient instructions       Regular exercise       Healthy diet/nutrition       Vision screening       Alcohol Use       Contraception       Family planning       Safe sex practices/STD prevention       Consider Hep C screening for all patients one time for ages 18-79 years       HIV screeninx in teen years, 1x in adult years, and at intervals if high risk       Advance Care Planning      BMI:   Estimated body mass index is 25.14 kg/m  as calculated from the following:    Height as of this encounter: 1.72 m (5' 7.72\").    Weight as of this encounter: 74.4 kg (164 lb).         She reports that she has never smoked. She has never used smokeless tobacco.          Wong Jackson MD  [facility]  "

## 2023-02-02 NOTE — PATIENT INSTRUCTIONS
Preventive Health Recommendations  Female Ages 26 - 39  Yearly exam:   See your health care provider every year in order to    Review health changes.     Discuss preventive care.      Review your medicines if you your doctor has prescribed any.    Until age 30: Get a Pap test every three years (more often if you have had an abnormal result).    After age 30: Talk to your doctor about whether you should have a Pap test every 3 years or have a Pap test with HPV screening every 5 years.   You do not need a Pap test if your uterus was removed (hysterectomy) and you have not had cancer.  You should be tested each year for STDs (sexually transmitted diseases), if you're at risk.   Talk to your provider about how often to have your cholesterol checked.  If you are at risk for diabetes, you should have a diabetes test (fasting glucose).  Shots: Get a flu shot each year. Get a tetanus shot every 10 years.   Nutrition:     Eat at least 5 servings of fruits and vegetables each day.    Eat whole-grain bread, whole-wheat pasta and brown rice instead of white grains and rice.    Get adequate Calcium and Vitamin D.     Lifestyle    Exercise at least 150 minutes a week (30 minutes a day, 5 days of the week). This will help you control your weight and prevent disease.    Limit alcohol to one drink per day.    No smoking.     Wear sunscreen to prevent skin cancer.    See your dentist every six months for an exam and cleaning.     Message   Recorded as Task   Date: 12/13/2017 08:54 AM, Created By: Rachael Hardy   Task Name: Medical Complaint Callback   Assigned To: [facility] [address] Team   Regarding Patient: Porfirio Ponce, Status: Active   CommentHerb Villeda - 13 Dec 2017 8:54 AM     TASK CREATED  Caller: Self; Medical Complaint; [phone] (Home)  PATIENT'S DAUGHTER,DENNISE, CALLED  PATIENT WAS JUST IN ON 12/6/17 FOR UTI AND GOT LEVAQUIN  PATINET COMPLETED RX AND STILL IS NOT BETTER  DAUGHTER WANTS TO KNOW IF DR CAN CALL IN NEW RX FOR UTI  SHE CAN DROP OFF A URINE SAMPLE IF NEEDED  PATIENT USES [facility]  [address]  PLEASE CALL DAUGHTER TO DISCUSS  [address] you [phone]  Nithya Odellceasar - 13 Dec 2017 10:50 AM     TASK EDITED  Pt  daughter brought a sample into the office  A urine dip was done and reviewed with Dr Mary Ellen Do, sample was sent for culture  Per Dr Mary Ellen Do, he does not want pt  to be started on any medication till results of culture are back since this is an ongoing issue  I spoke with pt  daughter Cecilia Gates and made her aware  Active Problems    1  Allergic rhinitis (477 9) (J30 9)   2  Allergy to insect bites and stings (V15 06) (Z91 038)   3  Angiography Cerebral Aneurysm   4  Anxiety (300 00) (F41 9)   5  Back pain (724 5) (M54 9)   6  Breast carcinoma (174 9) (C50 919)   7  Chronic urinary tract infection (599 0) (N39 0)   8  Decreased ambulation status (780 99) (R68 89)   9  Dizziness (780 4) (R42)   10  Dysuria (788 1) (R30 0)   11  History of Elevated serum alkaline phosphatase level (790 5) (R74 8)   12  Encounter for screening mammogram for malignant neoplasm of breast (V76 12)    (Z12 31)   13  Encounter for special screening examination for genitourinary disorder (V81 6) (Z13 89)   14  GERD (gastroesophageal reflux disease) (530 81) (K21 9)   15  Glaucoma (365 9) (H40 9)   16  Hyperglycemia (790 29) (R73 9)   17  Hyperlipidemia (272 4) (E78 5)   18   Hypertension (401 9) (I10)   19  Hypokalemia (276 8) (E87 6)   20  Instability of left knee joint (718 86) (M25 362)   21  Leg edema (782 3) (R60 0)   22  Lymphedema (457 1) (I89 0)   23  Macular degeneration (362 50) (H35 30)   24  Mastalgia (611 71) (N64 4)   25  Need for prophylactic vaccination and inoculation against influenza (V04 81) (Z23)   26  Neoplasm of skin of face (239 2) (D49 2)   27  Osteoarthritis (715 90) (M19 90)   28  Osteoporosis (733 00) (M81 0)   29  Ovarian cancer (183 0) (C56 9)   30  Paresthesias (782 0) (R20 2)   31  Restless legs syndrome (333 94) (G25 81)   32  Serous otitis media (381 4) (H65 90)   33  Thoracic back pain (724 1) (M54 6)   34  Visit for screening mammogram (V76 12) (Z12 31)   35  Vitamin D deficiency (268 9) (E55 9)    Current Meds   1  Azelastine HCl - 0 1 % Nasal Solution; use 2 sprays each nostril twice a day; Therapy: 12MJQ3459 to (Last Rx:97Sfl0549)  Requested for: 27Rrp9002 Ordered   2  ChlordiazePOXIDE HCl - 25 MG Oral Capsule; TAKE 1 CAPSULE TWICE DAILY; Therapy: 50KOT1287 to (Last Rx:06Nkj9334)  Requested for: 74Aim0887 Ordered   3  Combigan 0 2-0 5 % Ophthalmic Solution; Therapy: 99RKL5733 to Recorded   4  Enalapril Maleate 10 MG Oral Tablet; Take 1 tablet by mouth  daily; Therapy: 77JXD6827 to (Marcelo Handley)  Requested for: 29QZD3956; Last   Rx:07Nov2017 Ordered   5  Furosemide 40 MG Oral Tablet; Take 1 tablet by mouth  twice a day; Therapy: 42BYU1556 to (Papofine Emmanuelle)  Requested for: 48VHA0577; Last   Rx:07Nov2017 Ordered   6  Latanoprost 0 005 % Ophthalmic Solution; Therapy: 45EDE4165 to (Last Rx:06Jun2011)  Requested for: 40ZGL5043 Ordered   7  Lidocaine 5 % External Patch; APPLY 1 PATCH TO THE AFFECTED AREA AND LEAVE   IN PLACE FOR 12 HOURS, THEN REMOVE AND LEAVE OFF FOR 12 HOURS; Therapy: 94JZZ7183 to (Last Rx:16Jan2017)  Requested for: 31QOQ8087 Ordered   8  Meclizine HCl - 25 MG Oral Tablet;  Take one tablet by mouth  three times daily as   needed; Therapy: 15PYO4977 to (Victorine Butter)  Requested for: 63YRT0930; Last   Rx:07Nov2017 Ordered   9  Omeprazole 20 MG Oral Capsule Delayed Release; Take 1 capsule by mouth  daily; Therapy: 73MLN0786 to (Victorine Butter)  Requested for: 18JMZ6325; Last   Rx:07Nov2017 Ordered   10  Potassium Chloride Lacy ER 20 MEQ Oral Tablet Extended Release; Take 1 tablet by    mouth  daily; Therapy: 06IFB3412 to (Evaluate:17Mar2018)  Requested for: 22Mar2017; Last    Rx:22Mar2017 Ordered   11  Pramipexole Dihydrochloride 0 25 MG Oral Tablet; Take 1 tablet by mouth  daily; Therapy: 40Fsy5180 to (Victorine Butter)  Requested for: 08YWF5818; Last    Rx:07Nov2017 Ordered   12  TraMADol HCl - 50 MG Oral Tablet; take 2 tablets every 6 hours as needed; Therapy: 81SBE7202 to (Evaluate:04Jun2018)  Requested for: 57FKW2620; Last    Rx:16Htv8469 Ordered   13  Tylenol 8 Hour Arthritis Pain 650 MG Oral Tablet Extended Release; TAKE 1 TABLET 3-4    TIMES DAILY; Therapy: 31PZF9478 to Recorded   14  Vitamin D 1000 UNIT CAPS; TAKE 2 CAPSULE Daily; Therapy: 95DJY8348 to (Last Rx:94Hyg6988) Ordered   15  Xalatan 0 005 % Ophthalmic Solution (Latanoprost); Therapy: 22CJM7788 to (Last Rx:25Pis3236)  Requested for: 64Eas3466 Ordered    Allergies    1  Amoxicillin TABS   2  Cipro TABS   3  Macrobid CAPS   4  Bactrim TABS   5  CeleBREX CAPS   6  Codeine Derivatives   7  Keflex CAPS   8  Percocet TABS   9  Sulfa Drugs   10   Vioxx TABS    Signatures   Electronically signed by : Olga Javed, ; Dec 13 2017 10:51AM EST                       (Author)

## 2023-02-03 LAB
DEPRECATED CALCIDIOL+CALCIFEROL SERPL-MC: 19 UG/L (ref 20–75)
HCV AB SERPL QL IA: NONREACTIVE
HIV 1+2 AB+HIV1 P24 AG SERPL QL IA: NONREACTIVE

## 2023-02-06 ENCOUNTER — TELEPHONE (OUTPATIENT)
Dept: NURSING | Facility: CLINIC | Age: 28
End: 2023-02-06
Payer: COMMERCIAL

## 2023-02-06 NOTE — TELEPHONE ENCOUNTER
Patient calling with questions about recent lab work. Reviewed the following note from provider:        Informed patient that antibiotic was sent to [facility] in [place].    Patient had a few other questions about lab results that provider did not specifically comment on. Advised patient send provider a [employer] message. Patient agreed and had no further questions.    Steffi Lozano RN  02/06/23 1:48 PM  [facility] Nurse Advisor

## 2023-02-07 ENCOUNTER — TELEPHONE (OUTPATIENT)
Dept: FAMILY MEDICINE | Facility: CLINIC | Age: 28
End: 2023-02-07
Payer: COMMERCIAL

## 2023-02-07 LAB
BKR LAB AP GYN ADEQUACY: NORMAL
BKR LAB AP GYN INTERPRETATION: NORMAL
BKR LAB AP HPV REFLEX: NORMAL
BKR LAB AP PREVIOUS ABNORMAL: NORMAL
PATH REPORT.COMMENTS IMP SPEC: NORMAL
PATH REPORT.COMMENTS IMP SPEC: NORMAL
PATH REPORT.RELEVANT HX SPEC: NORMAL

## 2023-02-07 NOTE — TELEPHONE ENCOUNTER
----- Message from Wong Jackson MD sent at 2/2/2023  4:22 PM CST -----  Please call   Todays wet prep showed, the extra test I did when we collected the pap smear shows a bacterial infection. Rx sent for metronidazole. Please take. No alcohol while taking this. No need to recheck unless she wants to. Rest of test are pending.

## 2023-02-15 ENCOUNTER — E-VISIT (OUTPATIENT)
Dept: FAMILY MEDICINE | Facility: CLINIC | Age: 28
End: 2023-02-15
Payer: COMMERCIAL

## 2023-02-15 DIAGNOSIS — N76.0 BACTERIAL VAGINOSIS: Primary | ICD-10-CM

## 2023-02-15 DIAGNOSIS — B96.89 BACTERIAL VAGINOSIS: Primary | ICD-10-CM

## 2023-02-15 PROCEDURE — 99421 OL DIG E/M SVC 5-10 MIN: CPT | Performed by: FAMILY MEDICINE

## 2023-02-16 NOTE — PATIENT INSTRUCTIONS
Bacterial Vaginosis    You have a vaginal infection called bacterial vaginosis (BV). Both good and bad bacteria are present in a healthy vagina. BV occurs when these bacteria get out of balance. The number of bad bacteria increase. And the number of good bacteria decrease. BV is linked with sexual activity, but it's not a sexually transmitted infection (STI).   BV may or may not cause symptoms. If symptoms do occur, they can include:     Thin, gray, milky-white, or sometimes green discharge    Unpleasant odor or  fishy  smell    Itching, burning, or pain in or around the vagina  It is not known what causes BV, but certain factors can make the problem more likely. These can include:     Douching    Spermicides    Use of antibiotics    Change in hormone levels with pregnancy, breastfeeding, or menopause    Having sex with a new partner    Having sex with more than one partner  BV will sometimes go away on its own. But treatment is often advised. This is because untreated BV can raise the risk of more serious health problems such as:     Pelvic inflammatory disease (PID)     delivery (giving birth to a baby early if you re pregnant)    HIV and some other sexually transmitted infections (STIs)    Infection after surgery on the reproductive organs  Home care  General care    BV is most often treated with medicines called antibiotics. These may be given as pills or as a vaginal cream. If antibiotics are prescribed, be sure to use them exactly as directed. And complete all of the medicine, even if your symptoms go away.    Don't douche or having sex during treatment.    If you have sex with a female partner, ask your healthcare provider if she should also be treated.  Prevention    Don't douche.    Don't have sex. If you do have sex, then take steps to lower your risk:  ? Use condoms when having sex.  ? Limit the number of sex partners you have.    Follow-up care  Follow up with your healthcare provider, or as  advised.   When to get medical advice  Call your healthcare provider right away if:     You have a fever of 100.4 F (38 C) or higher, or as directed by your provider.    Your symptoms get worse, or they don t go away within a few days of starting treatment.    You have new pain in the lower belly or pelvic region.    You have side effects that bother you or a reaction to the pills or cream you re prescribed.    You or any of your sex partners have new symptoms, such as a rash, joint pain, or sores.  [employer] last reviewed this educational content on 6/1/2020 2000-2021 [employer]. All rights reserved. This information is not intended as a substitute for professional medical care. Always follow your healthcare professional's instructions.

## 2023-02-17 ENCOUNTER — TELEPHONE (OUTPATIENT)
Dept: FAMILY MEDICINE | Facility: CLINIC | Age: 28
End: 2023-02-17
Payer: COMMERCIAL

## 2023-02-17 DIAGNOSIS — N76.0 BACTERIAL VAGINOSIS: Primary | ICD-10-CM

## 2023-02-17 DIAGNOSIS — B96.89 BACTERIAL VAGINOSIS: Primary | ICD-10-CM

## 2023-02-17 RX ORDER — METRONIDAZOLE 500 MG/1
500 TABLET ORAL 2 TIMES DAILY
Qty: 14 TABLET | Refills: 0 | Status: SHIPPED | OUTPATIENT
Start: 2023-02-17 | End: 2023-02-24

## 2023-02-17 NOTE — TELEPHONE ENCOUNTER
Contacted patient and she would like to be treated again with Flagyl.  Patient uses [facility]  Patient had an E - Visit with Dr Jackson on 2/15/23    Message routed to Dr Jackson for Rx    Rekha Harper RN  [facility]      Wong Jackson MD  [facility] 23 hours ago (4:15 PM)     Please call. Does she want a refill on the flagyl?      metroNIDAZOLE (FLAGYL) 500 MG tablet 14 tablet 0 2/2/2023 2/9/2023 --   Sig - Route: Take 1 tablet (500 mg) by mouth 2 times daily for 7 days - Oral

## 2023-02-20 NOTE — TELEPHONE ENCOUNTER
RN called patient to inform her that rx has been sent.     Coni Perry RN BSN  [facility]

## 2023-05-19 ENCOUNTER — NURSE TRIAGE (OUTPATIENT)
Dept: NURSING | Facility: CLINIC | Age: 28
End: 2023-05-19
Payer: COMMERCIAL

## 2023-05-19 NOTE — TELEPHONE ENCOUNTER
"Nurse Triage SBAR    Is this a 2nd Level Triage? YES, LICENSED PRACTITIONER REVIEW IS REQUIRED, RN requesting.    Situation: neurological symptoms last night, no symptoms today.    Background: Patient was working an evening shift and last night her vision in her right eye was getting blurry and fuzzy. Patient also had some \"equilibrium issues\". Patient felt really off like who she was looking at and the back ground was not matching up, like one or the other was floating up and down.  Some numbness in her right thumb that dispersed into her other finger tips and went away in about 5 minutes. Vision corrected within 30 minutes. Patient had a headache and nausea as well.   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 Patient was home in bed at 11. At home she still had a headache, like she was dehydrated, nauseated, and exhausted. Headaches are normal for her since she was a kid.   Patient had a lot of caffeine yesterday, way more than her normal.     Patient reports having the vision issue one time before a year ago.    Patient slept well and feels fine except for a little bit of a \"headache hangover\".     Routing to provider for next steps and recommendation.   Care advice to stay well hydrated, eat at regular intervals to keep blood sugar stable, and call back with any return of symptoms when the symptoms start.     Assessment: providers recommendation on next steps.     Protocol Recommended Disposition:   No disposition on file.    Recommendation: next steps     Routed to provider    Does the patient meet one of the following criteria for ADS visit consideration? 16+ years old, with an [facility] PCP     TIP  Providers, please consider if this condition is appropriate for management at one of our Acute and Diagnostic Services sites.     If patient is a good candidate, please use dotphrase <dot>triageresponse and select Refer " "to ADS to document.      Patient was working an evening shift and last night her vision in her right eye was getting blurry and fuzzy. Patient also had some \"equilibrium issues\". Patient felt really off like who she was looking at and the back ground was not matching up, like one or the other was floating up and down.  Some numbness in her right thumb that dispersed into her other finger tips and went away in about 5 minutes. Vision corrected within 30 minutes. Patient had a headache and nausea as well.   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 Patient was home in bed at 11. At home she still had a headache, like she was dehydrated, nauseated, and exhausted. Headaches are normal for her since she was a kid.   Patient had a lot of caffeine yesterday, way more than her normal.     Patient reports having the vision issue one time before a year ago.    Patient slept well and feels fine except for a little bit of a \"headache hangover\".     Routing to provider for next steps and recommendation.   Care advice to stay well hydrated, eat at regular intervals to keep blood sugar stable, and call back with any return of symptoms when the symptoms start.     Patient requesting [employer] message as follow up.   Rosalinda Salinas RN   05/19/23 10:16 AM  [facility] Nurse Advisor    Additional Information    Negative: Difficult to awaken or acting confused (e.g., disoriented, slurred speech)    Negative: New neurologic deficit that is present NOW, sudden onset of ANY of the following: * Weakness of the face, arm, or leg on one side of the body* Numbness of the face, arm, or leg on one side of the body* Loss of speech or garbled speech    Negative: Sounds like a life-threatening emergency to the triager    Negative: Confusion, disorientation, or hallucinations is main symptom    Negative: Dizziness is main symptom    Negative: " Followed a head injury within last 3 days    Negative: Headache (with neurologic deficit)    Negative: Unable to urinate (or only a few drops) and bladder feels very full    Negative: Loss of bladder or bowel control (urine or bowel incontinence; wetting self, leaking stool) of new-onset    Negative: Back pain with numbness (loss of sensation) in groin or rectal area    Negative: Neurologic deficit that was brief (now gone), ANY of the following: * Weakness of the face, arm, or leg on one side of the body * Numbness of the face, arm, or leg on one side of the body * Loss of speech or garbled speech    Negative: Patient sounds very sick or weak to the triager    Negative: Neurologic deficit of gradual onset (e.g., days to weeks), ANY of the following: * Weakness of the face, arm, or leg on one side of the body* Numbness of the face, arm, or leg on one side of the body* Loss of speech or garbled speech    Negative: [place] palsy suspected (i.e., weakness only one side of the face, developing over hours to days, no other symptoms)    Negative: Tingling (e.g., pins and needles) of the face, arm or leg on one side of the body, that is present now (Exceptions: Chronic or recurrent symptom lasting > 4 weeks; or tingling from known cause, such as: bumped elbow, carpal tunnel syndrome, pinched nerve, frostbite.)    Negative: Back pain (with neurologic deficit)    Negative: Neck pain (with neurologic deficit)    Negative: Patient wants to be seen    Negative: Loss of speech or garbled speech is a chronic symptom (recurrent or ongoing problem lasting > 4 weeks)    Negative: Weakness of arm or leg is a chronic symptom (recurrent or ongoing problem lasting > 4 weeks)    Negative: Numbness or tingling in one or both hands is a chronic symptom (recurrent or ongoing problem lasting > 4 weeks)    Negative: Numbness or tingling in one or both feet is a chronic symptom (recurrent or ongoing problem lasting > 4 weeks)    Negative:  Numbness or tingling on both sides of body and is a new symptom lasting > 24 hours    Negative: Brief (now gone) tingling in hand (e.g., pins and needles) after prolonged lying on arm    Negative: Brief (now gone) tingling in foot (e.g., pins and needles) after prolonged sitting with legs crossed    Negative: Brief (now gone) numbness (or tingling or burning) in hand and fingers after bumped elbow    Protocols used: NEUROLOGIC DEFICIT-A-[place]

## 2023-05-19 NOTE — TELEPHONE ENCOUNTER
You can let patient know if symptoms are mostly resolved now ok to just monitor at home, consider good hydration today, maybe a tylenol if needed, go easy on or avoid caffeine. Maybe even consider some gatorade today.   The episode sounds like migraine. If she would like further evaluation or treatment for this, welcome to schedule or see neurology if the eye, dizziness symptoms are really worrying her now. If this becomes recurrent, certainly would recommend evaluation by neurology to seek their opinion on migraine or something else.

## 2023-05-19 NOTE — TELEPHONE ENCOUNTER
RN attempted to contact patient, but no answer. Left message on patient's voice mail to call clinic back.    If patient calls back, please relay message below from Dr Jackson. Thanks  Also sent patient a My Chart message per request.     Rekha Harper RN  [facility]      Dr Jackson  You can let patient know.  Please see below MyChart message and advise.  Thanks,  Eusebia RAMIREZ RN

## 2023-05-22 NOTE — TELEPHONE ENCOUNTER
RN made 2nd attempted to contact patient, but no answer. Left message on patient's voice mail to call clinic back.    If patient calls back, please relay message below from Dr Jackson. Thanks  Also sent patient a My Chart message per request.     Rekha Harper RN  [facility]      Dr Jackson  You can let patient know.  Please see below MyChart message and advise.  Thanks,  Eusebia RAMIREZ RN

## 2023-05-23 NOTE — TELEPHONE ENCOUNTER
RN made 3rd attempted to contact patient, but no answer. Left message on patient's voice mail to call clinic back.    Patient has an appointment scheduled on 5/25/23 with Dr Jackson  If patient calls back, please relay message below from Dr Jackson. Thanks  Also sent patient a My Chart message per request.   No further action needed.    Rekha Harper RN  [facility]

## 2023-05-25 ENCOUNTER — OFFICE VISIT (OUTPATIENT)
Dept: FAMILY MEDICINE | Facility: CLINIC | Age: 28
End: 2023-05-25
Payer: COMMERCIAL

## 2023-05-25 ENCOUNTER — TRANSFERRED RECORDS (OUTPATIENT)
Dept: HEALTH INFORMATION MANAGEMENT | Facility: CLINIC | Age: 28
End: 2023-05-25

## 2023-05-25 VITALS
OXYGEN SATURATION: 99 % | SYSTOLIC BLOOD PRESSURE: 122 MMHG | BODY MASS INDEX: 24.86 KG/M2 | RESPIRATION RATE: 18 BRPM | HEART RATE: 92 BPM | DIASTOLIC BLOOD PRESSURE: 84 MMHG | HEIGHT: 68 IN | WEIGHT: 164 LBS | TEMPERATURE: 98.6 F

## 2023-05-25 DIAGNOSIS — Z86.69: Primary | ICD-10-CM

## 2023-05-25 DIAGNOSIS — R51.9 NONINTRACTABLE HEADACHE, UNSPECIFIED CHRONICITY PATTERN, UNSPECIFIED HEADACHE TYPE: ICD-10-CM

## 2023-05-25 DIAGNOSIS — R00.2 PALPITATIONS: ICD-10-CM

## 2023-05-25 DIAGNOSIS — R11.0 CHRONIC NAUSEA: ICD-10-CM

## 2023-05-25 LAB
ALBUMIN SERPL BCG-MCNC: 5.2 G/DL (ref 3.5–5.2)
ALP SERPL-CCNC: 62 U/L (ref 35–104)
ALT SERPL W P-5'-P-CCNC: 19 U/L (ref 10–35)
ANION GAP SERPL CALCULATED.3IONS-SCNC: 14 MMOL/L (ref 7–15)
AST SERPL W P-5'-P-CCNC: 23 U/L (ref 10–35)
ATRIAL RATE - MUSE: 70 BPM
BILIRUB SERPL-MCNC: 0.5 MG/DL
BUN SERPL-MCNC: 8.4 MG/DL (ref 6–20)
CALCIUM SERPL-MCNC: 9.9 MG/DL (ref 8.6–10)
CHLORIDE SERPL-SCNC: 102 MMOL/L (ref 98–107)
CREAT SERPL-MCNC: 0.76 MG/DL (ref 0.51–0.95)
DEPRECATED HCO3 PLAS-SCNC: 23 MMOL/L (ref 22–29)
DIASTOLIC BLOOD PRESSURE - MUSE: NORMAL MMHG
ERYTHROCYTE [DISTWIDTH] IN BLOOD BY AUTOMATED COUNT: 12.8 % (ref 10–15)
GFR SERPL CREATININE-BSD FRML MDRD: >90 ML/MIN/1.73M2
GLUCOSE SERPL-MCNC: 106 MG/DL (ref 70–99)
HCT VFR BLD AUTO: 41.2 % (ref 35–47)
HGB BLD-MCNC: 13.4 G/DL (ref 11.7–15.7)
INTERPRETATION ECG - MUSE: NORMAL
MCH RBC QN AUTO: 30.2 PG (ref 26.5–33)
MCHC RBC AUTO-ENTMCNC: 32.5 G/DL (ref 31.5–36.5)
MCV RBC AUTO: 93 FL (ref 78–100)
P AXIS - MUSE: 43 DEGREES
PLATELET # BLD AUTO: 263 10E3/UL (ref 150–450)
POTASSIUM SERPL-SCNC: 4.2 MMOL/L (ref 3.4–5.3)
PR INTERVAL - MUSE: 146 MS
PROT SERPL-MCNC: 7.9 G/DL (ref 6.4–8.3)
QRS DURATION - MUSE: 74 MS
QT - MUSE: 400 MS
QTC - MUSE: 432 MS
R AXIS - MUSE: 44 DEGREES
RBC # BLD AUTO: 4.43 10E6/UL (ref 3.8–5.2)
SODIUM SERPL-SCNC: 139 MMOL/L (ref 136–145)
SYSTOLIC BLOOD PRESSURE - MUSE: NORMAL MMHG
T AXIS - MUSE: 46 DEGREES
TSH SERPL DL<=0.005 MIU/L-ACNC: 1.99 UIU/ML (ref 0.3–4.2)
VENTRICULAR RATE- MUSE: 70 BPM
WBC # BLD AUTO: 6.4 10E3/UL (ref 4–11)

## 2023-05-25 PROCEDURE — 36415 COLL VENOUS BLD VENIPUNCTURE: CPT | Performed by: FAMILY MEDICINE

## 2023-05-25 PROCEDURE — 93005 ELECTROCARDIOGRAM TRACING: CPT | Performed by: FAMILY MEDICINE

## 2023-05-25 PROCEDURE — 99214 OFFICE O/P EST MOD 30 MIN: CPT | Performed by: FAMILY MEDICINE

## 2023-05-25 PROCEDURE — 85027 COMPLETE CBC AUTOMATED: CPT | Performed by: FAMILY MEDICINE

## 2023-05-25 PROCEDURE — 84443 ASSAY THYROID STIM HORMONE: CPT | Performed by: FAMILY MEDICINE

## 2023-05-25 PROCEDURE — 80053 COMPREHEN METABOLIC PANEL: CPT | Performed by: FAMILY MEDICINE

## 2023-05-25 PROCEDURE — 93010 ELECTROCARDIOGRAM REPORT: CPT | Performed by: INTERNAL MEDICINE

## 2023-05-25 ASSESSMENT — PATIENT HEALTH QUESTIONNAIRE - PHQ9
10. IF YOU CHECKED OFF ANY PROBLEMS, HOW DIFFICULT HAVE THESE PROBLEMS MADE IT FOR YOU TO DO YOUR WORK, TAKE CARE OF THINGS AT HOME, OR GET ALONG WITH OTHER PEOPLE: SOMEWHAT DIFFICULT
SUM OF ALL RESPONSES TO PHQ QUESTIONS 1-9: 6
SUM OF ALL RESPONSES TO PHQ QUESTIONS 1-9: 6

## 2023-05-25 ASSESSMENT — ENCOUNTER SYMPTOMS
NAUSEA: 1
EYE PAIN: 1
HEADACHES: 1
NUMBNESS: 1
FATIGUE: 1

## 2023-05-25 NOTE — PROGRESS NOTES
Assessment & Plan     History of sudden visual loss  - MR Brain w/o Contrast; Future  Chronic nausea  - Comprehensive metabolic panel (BMP + Alb, Alk Phos, ALT, AST, Total. Bili, TP)  - TSH with free T4 reflex  - CBC with platelets  - MR Brain w/o Contrast; Future  Nonintractable headache, unspecified chronicity pattern, unspecified headache type  - MR Brain w/o Contrast; Future  Palpitations  - EKG 12-lead, tracing only  - TSH with free T4  - MR Brain w/o Contrast; Future  EHR reviewed.   Past medical history, problem list, past surgical history, family history, social history, medications reviewed, updated, reconciled.   A constellation of symptoms with unclear etiology.   We discussed certainly could be a combination of factors, like the amount of caffeine, red bull, medications. Agree the loss of vision is concerning and she has been meaning to seek eye care, she will likely find an eye doctor for her routine exam which she has not had in many years. With migraine history, suspect this is most likely but patient really doesn't feel this is migraine related. There are no focal deficits, but with sudden loss of vision as well chronic headaches with sudden change in character, will schedule brain imaging. Blood work collected to check on thyroid function, electrolytes, hemogram.   EKG collected and reviewed, unremarkable. Final report also reviewed.   If work up is negative we discussed possibility of neurology referral if needed.   We discussed review of her psychiatric medications with her mental health provider.   She will monitor her symptoms closely. She notes these are resolved now. Discussed reasons to call or be seen urgently.     Wong Jackson MD  [facility]    Sharmin Almonte is a 28 year old, presenting for the following health issues:  Dizziness, Slurred Speech (Somewhat slurred speech ), Numbness (Fingers ), Nausea (On going for past few months /), Headache, Eye Problem  (Started with very blurry vision at work in only the right eye, looks like floaters and something going across eyes ), and Fatigue    [age] year old female with history of migraines, bipolar disease, ADHD, here for follow up of her symptoms last week.   She called in to report some very unusual symptoms. This happened at the end of her shift, she works as a . She closed that night which she normally doesn't do. She notes she had a huge amount of coffee that day, possibly four or six cups where she normally has one to two cups. She also had a redbull since she was really afraid of being too tired. She was very busy at work. At some point during the evening she started feeling very sick, feels like her vision was lost in her right eye for a few seconds. It seemed like her left eye was affected later. She felt very dizzy, like the floor was floating to her. She notes nausea at the time, but this is not unusual, she feels nauseated all the time lately. She certainly felt like her heart was racing and felt her fingers go numb. Is not sure if it is her medication regimen, she discontinued a couple and is going to talk to her psychiatrist about this. She notes a slight headache after this and actually threw up, she often does this to help her nausea/headache, it usually helps her feel better. She does not think this was a migraine since she has had migraines since she was a little kid. She actually used to scream in pain as a kid and this was not exactly like that. After her shift she went home and slept ok. She feels mostly better now, but the episode and symptoms that night are worrying her a lot.           5/25/2023    11:18 AM   Additional Questions   Roomed by Tia     Numbness  Associated symptoms include fatigue, headaches, nausea and numbness.   Nausea  Associated symptoms include fatigue, headaches, nausea and numbness.   Headache   Associated symptoms include nausea.   Eye Problem   Associated  "symptoms include numbness and nausea.   Fatigue  Associated symptoms include fatigue, headaches, nausea and numbness.   History of Present Illness       Reason for visit:  To go over the details of what happened during my dizzy episode last thursday  Symptom onset:  3-7 days ago  Symptoms include:  Vision loss, hand tingling, vomiting, slurred speech, vertigo. My eyes have been irritated ever since then  Symptom intensity:  Severe  Symptom progression:  Improving  Had these symptoms before:  Yes  Has tried/received treatment for these symptoms:  No  What makes it worse:  Moving alot too quickly, too much caffeine  What makes it better:  Water, laying down, closing my eyes    She eats 0-1 servings of fruits and vegetables daily.She consumes 2 sweetened beverage(s) daily.She exercises with enough effort to increase her heart rate 60 or more minutes per day.  She exercises with enough effort to increase her heart rate 5 days per week. She is missing 3 dose(s) of medications per week.  She is not taking prescribed medications regularly due to remembering to take.    Today's PHQ-9         PHQ-9 Total Score: 6    PHQ-9 Q9 Thoughts of better off dead/self-harm past 2 weeks :   Not at all    How difficult have these problems made it for you to do your work, take care of things at home, or get along with other people: Somewhat difficult       Review of Systems   Constitutional: Positive for fatigue.   Eyes: Positive for pain.   Gastrointestinal: Positive for nausea.   Neurological: Positive for numbness and headaches.            Objective    /84 (BP Location: Right arm, Patient Position: Sitting, Cuff Size: Adult Regular)   Pulse 92   Temp 98.6  F (37  C) (Temporal)   Resp 18   Ht 1.73 m (5' 8.11\")   Wt 74.4 kg (164 lb)   LMP 05/08/2023 (Approximate)   SpO2 99%   BMI 24.86 kg/m    Body mass index is 24.86 kg/m .  Physical Exam   GENERAL: healthy, alert and no distress  EYES: Eyes grossly normal to inspection, " PERRL and conjunctivae and sclerae normal  HENT: ear canals and TM's normal, nose and mouth without ulcers or lesions  NECK: no adenopathy, no asymmetry, masses, or scars and thyroid normal to palpation  RESP: lungs clear to auscultation - no rales, rhonchi or wheezes  CV: regular rate and rhythm, normal S1 S2, no S3 or S4, no murmur, click or rub, no peripheral edema and peripheral pulses strong  MS: no gross musculoskeletal defects noted, no edema  NEURO: Normal strength and tone, sensory exam grossly normal, mentation intact, cranial nerves 2-12 intact and gait normal including heel/toe/tandem walking  PSYCH: mentation appears normal, affect normal/bright    Results for orders placed or performed in visit on 05/25/23   Comprehensive metabolic panel (BMP + Alb, Alk Phos, ALT, AST, Total. Bili, TP)     Status: Abnormal   Result Value Ref Range    Sodium 139 136 - 145 mmol/L    Potassium 4.2 3.4 - 5.3 mmol/L    Chloride 102 98 - 107 mmol/L    Carbon Dioxide (CO2) 23 22 - 29 mmol/L    Anion Gap 14 7 - 15 mmol/L    Urea Nitrogen 8.4 6.0 - 20.0 mg/dL    Creatinine 0.76 0.51 - 0.95 mg/dL    Calcium 9.9 8.6 - 10.0 mg/dL    Glucose 106 (H) 70 - 99 mg/dL    Alkaline Phosphatase 62 35 - 104 U/L    AST 23 10 - 35 U/L    ALT 19 10 - 35 U/L    Protein Total 7.9 6.4 - 8.3 g/dL    Albumin 5.2 3.5 - 5.2 g/dL    Bilirubin Total 0.5 <=1.2 mg/dL    GFR Estimate >90 >60 mL/min/1.73m2   TSH with free T4 reflex     Status: Normal   Result Value Ref Range    TSH 1.99 0.30 - 4.20 uIU/mL   CBC with platelets     Status: Normal   Result Value Ref Range    WBC Count 6.4 4.0 - 11.0 10e3/uL    RBC Count 4.43 3.80 - 5.20 10e6/uL    Hemoglobin 13.4 11.7 - 15.7 g/dL    Hematocrit 41.2 35.0 - 47.0 %    MCV 93 78 - 100 fL    MCH 30.2 26.5 - 33.0 pg    MCHC 32.5 31.5 - 36.5 g/dL    RDW 12.8 10.0 - 15.0 %    Platelet Count 263 150 - 450 10e3/uL   EKG 12-lead, tracing only     Status: None   Result Value Ref Range    Systolic Blood Pressure  mmHg     Diastolic Blood Pressure  mmHg    Ventricular Rate 70 BPM    Atrial Rate 70 BPM    DC Interval 146 ms    QRS Duration 74 ms     ms    QTc 432 ms    P Axis 43 degrees    R AXIS 44 degrees    T Axis 46 degrees    Interpretation ECG       Sinus rhythm  Normal ECG  No previous ECGs available  Confirmed by NEO ALANIS MD LOC:CRISTIAN ([id number]) on 5/25/2023 2:45:36 PM           Prior to immunization administration, verified patients identity using patient s name and date of birth. Please see Immunization Activity for additional information.     Screening Questionnaire for Adult Immunization    Are you sick today?   No   Do you have allergies to medications, food, a vaccine component or latex?   No   Have you ever had a serious reaction after receiving a vaccination?   No   Do you have a long-term health problem with heart, lung, kidney, or metabolic disease (e.g., diabetes), asthma, a blood disorder, no spleen, complement component deficiency, a cochlear implant, or a spinal fluid leak?  Are you on long-term aspirin therapy?   No   Do you have cancer, leukemia, HIV/AIDS, or any other immune system problem?   No   Do you have a parent, brother, or sister with an immune system problem?   No   In the past 3 months, have you taken medications that affect  your immune system, such as prednisone, other steroids, or anticancer drugs; drugs for the treatment of rheumatoid arthritis, Crohn s disease, or psoriasis; or have you had radiation treatments?   No   Have you had a seizure, or a brain or other nervous system problem?   No   During the past year, have you received a transfusion of blood or blood    products, or been given immune (gamma) globulin or antiviral drug?   No   For women: Are you pregnant or is there a chance you could become       pregnant during the next month?   Yes   Have you received any vaccinations in the past 4 weeks?   No     Immunization questionnaire was positive for at least one answer.  Notified  .      Injection of given by Swathi Hoyos CMA. Patient instructed to remain in clinic for 15 minutes afterwards, and to report any adverse reactions.     Screening performed by Swathi Hoyos CMA on 5/25/2023 at 11:23 AM.

## 2023-11-01 NOTE — TELEPHONE ENCOUNTER
Who is calling:  Patient's mother, Verito Mojica.  Reason for Call:  Patient's mother is asking if provider has an alternative medication for the modafinil that was denied.  Patient's mother is requesting an alternative that is not addictive.  Date of last appointment with primary care: 5/23/19  Okay to leave a detailed message: Yes       Patient/Caregiver provided printed discharge information.

## 2024-03-03 ENCOUNTER — HEALTH MAINTENANCE LETTER (OUTPATIENT)
Age: 29
End: 2024-03-03

## 2024-11-24 ENCOUNTER — HOSPITAL ENCOUNTER (OUTPATIENT)
Facility: CLINIC | Age: 29
Discharge: HOME | End: 2024-11-24
Attending: OBSTETRICS & GYNECOLOGY | Admitting: OBSTETRICS & GYNECOLOGY
Payer: COMMERCIAL

## 2024-11-24 ENCOUNTER — HOSPITAL ENCOUNTER (OUTPATIENT)
Facility: CLINIC | Age: 29
End: 2024-11-24
Admitting: OBSTETRICS & GYNECOLOGY

## 2024-11-24 VITALS — DIASTOLIC BLOOD PRESSURE: 81 MMHG | TEMPERATURE: 98 F | SYSTOLIC BLOOD PRESSURE: 111 MMHG

## 2024-11-24 PROBLEM — Z36.89 ENCOUNTER FOR TRIAGE IN PREGNANT PATIENT: Status: ACTIVE | Noted: 2024-11-24

## 2024-11-24 LAB
ALBUMIN UR-MCNC: NEGATIVE MG/DL
APPEARANCE UR: ABNORMAL
BACTERIA #/AREA URNS HPF: ABNORMAL /HPF
BILIRUB UR QL STRIP: NEGATIVE
CLUE CELLS: NORMAL
COLOR UR AUTO: ABNORMAL
GLUCOSE UR STRIP-MCNC: NEGATIVE MG/DL
HGB UR QL STRIP: NEGATIVE
KETONES UR STRIP-MCNC: NEGATIVE MG/DL
LEUKOCYTE ESTERASE UR QL STRIP: NEGATIVE
MUCOUS THREADS #/AREA URNS LPF: PRESENT /LPF
NITRATE UR QL: NEGATIVE
PH UR STRIP: 7 [PH] (ref 5–7)
RBC URINE: 0 /HPF
SP GR UR STRIP: 1.01 (ref 1–1.03)
SQUAMOUS EPITHELIAL: 5 /HPF
TRICHOMONAS, WET PREP: NORMAL
UROBILINOGEN UR STRIP-MCNC: NORMAL MG/DL
WBC URINE: 2 /HPF
WBC'S/HIGH POWER FIELD, WET PREP: NORMAL
YEAST, WET PREP: NORMAL

## 2024-11-24 PROCEDURE — 87210 SMEAR WET MOUNT SALINE/INK: CPT | Performed by: OBSTETRICS & GYNECOLOGY

## 2024-11-24 PROCEDURE — 81001 URINALYSIS AUTO W/SCOPE: CPT | Performed by: OBSTETRICS & GYNECOLOGY

## 2024-11-24 PROCEDURE — G0463 HOSPITAL OUTPT CLINIC VISIT: HCPCS | Mod: 25

## 2024-11-24 PROCEDURE — 59025 FETAL NON-STRESS TEST: CPT

## 2024-11-24 RX ORDER — FAMOTIDINE 20 MG
TABLET ORAL
COMMUNITY

## 2024-11-24 RX ORDER — MICONAZOLE NITRATE 2 %
CREAM WITH APPLICATOR VAGINAL AT BEDTIME
COMMUNITY

## 2024-11-24 RX ORDER — LIDOCAINE 40 MG/G
CREAM TOPICAL
Status: DISCONTINUED | OUTPATIENT
Start: 2024-11-24 | End: 2024-11-24 | Stop reason: HOSPADM

## 2024-11-24 RX ORDER — VITAMIN A, VITAMIN C, VITAMIN D-3, VITAMIN E, VITAMIN B-1, VITAMIN B-2, NIACIN, VITAMIN B-6, CALCIUM, IRON, ZINC, COPPER 4000; 120; 400; 22; 1.84; 3; 20; 10; 1; 12; 200; 27; 25; 2 [IU]/1; MG/1; [IU]/1; MG/1; MG/1; MG/1; MG/1; MG/1; MG/1; UG/1; MG/1; MG/1; MG/1; MG/1
1 TABLET ORAL DAILY
COMMUNITY

## 2024-11-24 ASSESSMENT — ACTIVITIES OF DAILY LIVING (ADL)
ADLS_ACUITY_SCORE: 0
ADLS_ACUITY_SCORE: 0

## 2024-11-24 NOTE — PROVIDER NOTIFICATION
"   11/24/24 1206   Provider Notification   Provider Name/Title Dr. Flannery   Method of Notification In Department   Request Evaluate - Remote   Notification Reason Patient Arrived;Lab/Diagnostic Study     Discuss with MD pt's triage information (see previous note). Pt reports facial swelling that is new and continued swelling in hands and feet. Noted increased HAs this past week that resolved with tylenol and sleep. Yest she had RUQ pain that felt like round ligament pain and is no longer present. Increased nausea the past week, but no vomiting.     Pt reports since yesterday AM she notes \"fluttering\" movements, but no big movements which is a change. Since pt's arrival nurse has witnessed at least 3 big movements and pt was given marker to indicate large movements. She's indicated on the strip multiple times that she's feeling big movements.     Pt reports mid upper abdominal pain that waxes and wanes that is located over an area that looks like possible diastasis.     Reports vaginal itching, vaginal odor, thick discharge, and burning with urination. Pt indicates she's been taking yeast infection treatment with her 4th dose being last night. Also reports increased cramping the past week that lasts for short periods of time. Pt reports eating and drinking without concerns.    Pt has appt scheduled for this Tuesday.    FHT Category I. There was a period of broken tracing when pt was putting her underwear back on. Currently moderate variability with accelerations. Per toco pt has occasional contraction that palpates mild and pt feels tightening occasionally. MD reviewed strip. MD reviewed UA and wet prep results - both negative.    MD orders pt's discharge home after 10 more minutes of monitoring with continuing yeast infection treatment and using prn tylenol for HAs.  "

## 2024-11-24 NOTE — PLAN OF CARE
Data: Patient presented to [facility]: 2024 10:36 AM.  Reason for maternal/fetal assessment is decreased fetal movement, abdominal pain, increased swelling, generalized weakness/fatigue. Patient reports since yesterday morning noticing baby movements are less often and not as strong, feels more as flutters. Feels constant mid upper abdominal pain that waxes and wanes in intensity. Generalized weakness, fatigue, increased nausea for the past week. Noted swelling in face since yesterday and continued swelling in hands and feet.  Patient is a .  Prenatal record reviewed. Pregnancy has been uncomplicated..  Gestational Age 31w6d. VSS. Fetal movement is less than usual. Patient denies uterine contractions, leaking of vaginal fluid/rupture of membranes, vaginal bleeding, pelvic pressure, vomiting, visual disturbances. Support person is present.   Action: Verbal consent for EFM. Triage assessment completed. Bill of rights reviewed.  Response: Patient verbalized agreement with plan. Will contact Dr Letty Flannery with update and for further orders.

## 2024-11-24 NOTE — PLAN OF CARE
Data: Patient assessed in the [facility] for decreased fetal movement, abdominal pain, swelling.  Cervical exam not examined.  Membranes intact.  Contractions/uterine assessment occasional with pt feeling some, but not all.  Action:  Presumed adequate fetal oxygenation documented (see flow record). Discharge instructions reviewed.  Patient instructed to report change in fetal movement, vaginal leaking of fluid or bleeding, abdominal pain, or any concerns related to the pregnancy to her nurse/physician.    Response: Orders to discharge home per Dr. Flannery.  Patient verbalized understanding of education and verbalized agreement with plan. Discharged to home at 1230.

## 2025-03-15 ENCOUNTER — HEALTH MAINTENANCE LETTER (OUTPATIENT)
Age: 30
End: 2025-03-15